# Patient Record
Sex: FEMALE | Race: WHITE | NOT HISPANIC OR LATINO | URBAN - METROPOLITAN AREA
[De-identification: names, ages, dates, MRNs, and addresses within clinical notes are randomized per-mention and may not be internally consistent; named-entity substitution may affect disease eponyms.]

---

## 2017-07-08 ENCOUNTER — COMMUNICATION - HEALTHEAST (OUTPATIENT)
Dept: ENDOCRINOLOGY | Facility: CLINIC | Age: 32
End: 2017-07-08

## 2017-07-08 DIAGNOSIS — E03.9 HYPOTHYROIDISM: ICD-10-CM

## 2017-07-20 ENCOUNTER — AMBULATORY - HEALTHEAST (OUTPATIENT)
Dept: ENDOCRINOLOGY | Facility: CLINIC | Age: 32
End: 2017-07-20

## 2017-07-20 ENCOUNTER — COMMUNICATION - HEALTHEAST (OUTPATIENT)
Dept: LAB | Facility: CLINIC | Age: 32
End: 2017-07-20

## 2017-07-20 DIAGNOSIS — E03.9 HYPOTHYROIDISM: ICD-10-CM

## 2017-07-26 ENCOUNTER — AMBULATORY - HEALTHEAST (OUTPATIENT)
Dept: LAB | Facility: CLINIC | Age: 32
End: 2017-07-26

## 2017-07-26 DIAGNOSIS — E03.9 HYPOTHYROIDISM: ICD-10-CM

## 2017-08-02 ENCOUNTER — OFFICE VISIT - HEALTHEAST (OUTPATIENT)
Dept: ENDOCRINOLOGY | Facility: CLINIC | Age: 32
End: 2017-08-02

## 2017-08-02 DIAGNOSIS — E03.9 HYPOTHYROIDISM: ICD-10-CM

## 2017-08-02 ASSESSMENT — MIFFLIN-ST. JEOR: SCORE: 1527.52

## 2018-01-19 ENCOUNTER — OFFICE VISIT - HEALTHEAST (OUTPATIENT)
Dept: FAMILY MEDICINE | Facility: CLINIC | Age: 33
End: 2018-01-19

## 2018-01-19 ENCOUNTER — RECORDS - HEALTHEAST (OUTPATIENT)
Dept: GENERAL RADIOLOGY | Facility: CLINIC | Age: 33
End: 2018-01-19

## 2018-01-19 DIAGNOSIS — L70.0 ACNE VULGARIS: ICD-10-CM

## 2018-01-19 DIAGNOSIS — Z79.899 MEDICATION MANAGEMENT: ICD-10-CM

## 2018-01-19 DIAGNOSIS — M25.511 PAIN IN RIGHT SHOULDER: ICD-10-CM

## 2018-01-19 DIAGNOSIS — M25.511 ACUTE PAIN OF RIGHT SHOULDER: ICD-10-CM

## 2018-01-19 DIAGNOSIS — Z23 NEED FOR VACCINATION: ICD-10-CM

## 2018-01-19 LAB
ANION GAP SERPL CALCULATED.3IONS-SCNC: 8 MMOL/L (ref 5–18)
BUN SERPL-MCNC: 13 MG/DL (ref 8–22)
CALCIUM SERPL-MCNC: 9.3 MG/DL (ref 8.5–10.5)
CHLORIDE BLD-SCNC: 104 MMOL/L (ref 98–107)
CO2 SERPL-SCNC: 26 MMOL/L (ref 22–31)
CREAT SERPL-MCNC: 0.65 MG/DL (ref 0.6–1.1)
GFR SERPL CREATININE-BSD FRML MDRD: >60 ML/MIN/1.73M2
GLUCOSE BLD-MCNC: 79 MG/DL (ref 70–125)
POTASSIUM BLD-SCNC: 4.5 MMOL/L (ref 3.5–5)
SODIUM SERPL-SCNC: 138 MMOL/L (ref 136–145)

## 2018-01-19 ASSESSMENT — MIFFLIN-ST. JEOR: SCORE: 1513.91

## 2018-02-02 ENCOUNTER — COMMUNICATION - HEALTHEAST (OUTPATIENT)
Dept: FAMILY MEDICINE | Facility: CLINIC | Age: 33
End: 2018-02-02

## 2018-02-12 ENCOUNTER — COMMUNICATION - HEALTHEAST (OUTPATIENT)
Dept: FAMILY MEDICINE | Facility: CLINIC | Age: 33
End: 2018-02-12

## 2018-03-09 ENCOUNTER — COMMUNICATION - HEALTHEAST (OUTPATIENT)
Dept: ADMINISTRATIVE | Facility: CLINIC | Age: 33
End: 2018-03-09

## 2018-03-09 DIAGNOSIS — E03.9 HYPOTHYROIDISM: ICD-10-CM

## 2018-12-08 ENCOUNTER — COMMUNICATION - HEALTHEAST (OUTPATIENT)
Dept: ENDOCRINOLOGY | Facility: CLINIC | Age: 33
End: 2018-12-08

## 2018-12-08 DIAGNOSIS — E03.9 HYPOTHYROIDISM: ICD-10-CM

## 2019-01-22 ENCOUNTER — COMMUNICATION - HEALTHEAST (OUTPATIENT)
Dept: ADMINISTRATIVE | Facility: CLINIC | Age: 34
End: 2019-01-22

## 2019-01-22 DIAGNOSIS — E03.9 HYPOTHYROIDISM: ICD-10-CM

## 2019-01-24 ENCOUNTER — AMBULATORY - HEALTHEAST (OUTPATIENT)
Dept: ENDOCRINOLOGY | Facility: CLINIC | Age: 34
End: 2019-01-24

## 2019-01-24 DIAGNOSIS — E03.9 HYPOTHYROIDISM: ICD-10-CM

## 2019-02-28 ENCOUNTER — COMMUNICATION - HEALTHEAST (OUTPATIENT)
Dept: FAMILY MEDICINE | Facility: CLINIC | Age: 34
End: 2019-02-28

## 2019-02-28 ENCOUNTER — AMBULATORY - HEALTHEAST (OUTPATIENT)
Dept: LAB | Facility: CLINIC | Age: 34
End: 2019-02-28

## 2019-02-28 DIAGNOSIS — E03.9 HYPOTHYROIDISM: ICD-10-CM

## 2019-02-28 LAB
T4 FREE SERPL-MCNC: 1.1 NG/DL (ref 0.7–1.8)
TSH SERPL DL<=0.005 MIU/L-ACNC: 1.42 UIU/ML (ref 0.3–5)

## 2019-03-07 ENCOUNTER — OFFICE VISIT - HEALTHEAST (OUTPATIENT)
Dept: ENDOCRINOLOGY | Facility: CLINIC | Age: 34
End: 2019-03-07

## 2019-03-07 DIAGNOSIS — E03.9 HYPOTHYROIDISM, UNSPECIFIED TYPE: ICD-10-CM

## 2019-03-07 DIAGNOSIS — E03.9 HYPOTHYROIDISM: ICD-10-CM

## 2019-03-07 ASSESSMENT — MIFFLIN-ST. JEOR: SCORE: 1539.31

## 2020-02-19 ENCOUNTER — COMMUNICATION - HEALTHEAST (OUTPATIENT)
Dept: ENDOCRINOLOGY | Facility: CLINIC | Age: 35
End: 2020-02-19

## 2020-02-19 DIAGNOSIS — E03.9 HYPOTHYROIDISM: ICD-10-CM

## 2020-02-28 ENCOUNTER — AMBULATORY - HEALTHEAST (OUTPATIENT)
Dept: LAB | Facility: CLINIC | Age: 35
End: 2020-02-28

## 2020-02-28 DIAGNOSIS — E03.9 HYPOTHYROIDISM: ICD-10-CM

## 2020-02-28 LAB
T4 FREE SERPL-MCNC: 1.2 NG/DL (ref 0.7–1.8)
TSH SERPL DL<=0.005 MIU/L-ACNC: 1.1 UIU/ML (ref 0.3–5)

## 2020-03-06 ENCOUNTER — OFFICE VISIT - HEALTHEAST (OUTPATIENT)
Dept: ENDOCRINOLOGY | Facility: CLINIC | Age: 35
End: 2020-03-06

## 2020-03-06 DIAGNOSIS — E03.9 HYPOTHYROIDISM: ICD-10-CM

## 2020-03-06 ASSESSMENT — MIFFLIN-ST. JEOR: SCORE: 1620.51

## 2020-05-18 ENCOUNTER — OFFICE VISIT - HEALTHEAST (OUTPATIENT)
Dept: FAMILY MEDICINE | Facility: CLINIC | Age: 35
End: 2020-05-18

## 2020-05-18 DIAGNOSIS — F33.0 MILD EPISODE OF RECURRENT MAJOR DEPRESSIVE DISORDER (H): ICD-10-CM

## 2020-05-18 DIAGNOSIS — E03.9 HYPOTHYROIDISM, UNSPECIFIED TYPE: ICD-10-CM

## 2020-05-18 DIAGNOSIS — F90.2 ATTENTION DEFICIT HYPERACTIVITY DISORDER (ADHD), COMBINED TYPE: ICD-10-CM

## 2020-05-18 DIAGNOSIS — F41.9 ANXIETY: ICD-10-CM

## 2020-05-18 ASSESSMENT — ANXIETY QUESTIONNAIRES
GAD7 TOTAL SCORE: 13
2. NOT BEING ABLE TO STOP OR CONTROL WORRYING: MORE THAN HALF THE DAYS
5. BEING SO RESTLESS THAT IT IS HARD TO SIT STILL: MORE THAN HALF THE DAYS
7. FEELING AFRAID AS IF SOMETHING AWFUL MIGHT HAPPEN: SEVERAL DAYS
7. FEELING AFRAID AS IF SOMETHING AWFUL MIGHT HAPPEN: SEVERAL DAYS
4. TROUBLE RELAXING: MORE THAN HALF THE DAYS
1. FEELING NERVOUS, ANXIOUS, OR ON EDGE: MORE THAN HALF THE DAYS
5. BEING SO RESTLESS THAT IT IS HARD TO SIT STILL: MORE THAN HALF THE DAYS
GAD7 TOTAL SCORE: 13
6. BECOMING EASILY ANNOYED OR IRRITABLE: NEARLY EVERY DAY
3. WORRYING TOO MUCH ABOUT DIFFERENT THINGS: SEVERAL DAYS
2. NOT BEING ABLE TO STOP OR CONTROL WORRYING: MORE THAN HALF THE DAYS
3. WORRYING TOO MUCH ABOUT DIFFERENT THINGS: SEVERAL DAYS
1. FEELING NERVOUS, ANXIOUS, OR ON EDGE: MORE THAN HALF THE DAYS
4. TROUBLE RELAXING: MORE THAN HALF THE DAYS
6. BECOMING EASILY ANNOYED OR IRRITABLE: NEARLY EVERY DAY

## 2020-05-18 ASSESSMENT — PATIENT HEALTH QUESTIONNAIRE - PHQ9
SUM OF ALL RESPONSES TO PHQ QUESTIONS 1-9: 10
SUM OF ALL RESPONSES TO PHQ QUESTIONS 1-9: 10

## 2020-05-26 ENCOUNTER — COMMUNICATION - HEALTHEAST (OUTPATIENT)
Dept: FAMILY MEDICINE | Facility: CLINIC | Age: 35
End: 2020-05-26

## 2020-05-26 ENCOUNTER — OFFICE VISIT - HEALTHEAST (OUTPATIENT)
Dept: FAMILY MEDICINE | Facility: CLINIC | Age: 35
End: 2020-05-26

## 2020-05-26 DIAGNOSIS — F33.0 MILD EPISODE OF RECURRENT MAJOR DEPRESSIVE DISORDER (H): ICD-10-CM

## 2020-05-26 DIAGNOSIS — F41.9 ANXIETY: ICD-10-CM

## 2020-05-26 DIAGNOSIS — E03.9 HYPOTHYROIDISM, UNSPECIFIED TYPE: ICD-10-CM

## 2020-05-26 DIAGNOSIS — F90.2 ATTENTION DEFICIT HYPERACTIVITY DISORDER (ADHD), COMBINED TYPE: ICD-10-CM

## 2020-06-17 ENCOUNTER — COMMUNICATION - HEALTHEAST (OUTPATIENT)
Dept: FAMILY MEDICINE | Facility: CLINIC | Age: 35
End: 2020-06-17

## 2020-06-17 DIAGNOSIS — F90.2 ATTENTION DEFICIT HYPERACTIVITY DISORDER (ADHD), COMBINED TYPE: ICD-10-CM

## 2020-07-23 ENCOUNTER — APPOINTMENT (OUTPATIENT)
Age: 35
Setting detail: DERMATOLOGY
End: 2020-07-23

## 2020-07-23 VITALS — WEIGHT: 200 LBS | HEIGHT: 66 IN

## 2020-07-23 DIAGNOSIS — L81.4 OTHER MELANIN HYPERPIGMENTATION: ICD-10-CM

## 2020-07-23 DIAGNOSIS — D22 MELANOCYTIC NEVI: ICD-10-CM

## 2020-07-23 DIAGNOSIS — D18.0 HEMANGIOMA: ICD-10-CM

## 2020-07-23 DIAGNOSIS — L70.0 ACNE VULGARIS: ICD-10-CM

## 2020-07-23 DIAGNOSIS — Z71.89 OTHER SPECIFIED COUNSELING: ICD-10-CM

## 2020-07-23 PROBLEM — D18.01 HEMANGIOMA OF SKIN AND SUBCUTANEOUS TISSUE: Status: ACTIVE | Noted: 2020-07-23

## 2020-07-23 PROBLEM — D22.5 MELANOCYTIC NEVI OF TRUNK: Status: ACTIVE | Noted: 2020-07-23

## 2020-07-23 PROCEDURE — 99203 OFFICE O/P NEW LOW 30 MIN: CPT

## 2020-07-23 PROCEDURE — OTHER ADDITIONAL NOTES: OTHER

## 2020-07-23 PROCEDURE — OTHER COUNSELING: OTHER

## 2020-07-23 PROCEDURE — OTHER PRESCRIPTION: OTHER

## 2020-07-23 RX ORDER — CLINDAMYCIN PHOSPHATE 10 MG/ML
1% LOTION TOPICAL BID
Qty: 1 | Refills: 1 | Status: ERX | COMMUNITY
Start: 2020-07-23

## 2020-07-23 RX ORDER — SPIRONOLACTONE 50 MG/1
50 MG TABLET, FILM COATED ORAL BID
Qty: 60 | Refills: 1 | Status: ERX | COMMUNITY
Start: 2020-07-23

## 2020-07-23 ASSESSMENT — LOCATION ZONE DERM: LOCATION ZONE: TRUNK

## 2020-07-23 ASSESSMENT — LOCATION DETAILED DESCRIPTION DERM: LOCATION DETAILED: INFERIOR THORACIC SPINE

## 2020-07-23 ASSESSMENT — LOCATION SIMPLE DESCRIPTION DERM: LOCATION SIMPLE: UPPER BACK

## 2020-07-23 NOTE — PROCEDURE: COUNSELING
Topical Retinoid Pregnancy And Lactation Text: This medication is Pregnancy Category C. It is unknown if this medication is excreted in breast milk.
Erythromycin Counseling:  I discussed with the patient the risks of erythromycin including but not limited to GI upset, allergic reaction, drug rash, diarrhea, increase in liver enzymes, and yeast infections.
Bactrim Pregnancy And Lactation Text: This medication is Pregnancy Category D and is known to cause fetal risk.  It is also excreted in breast milk.
Detail Level: Generalized
Tetracycline Counseling: Patient counseled regarding possible photosensitivity and increased risk for sunburn.  Patient instructed to avoid sunlight, if possible.  When exposed to sunlight, patients should wear protective clothing, sunglasses, and sunscreen.  The patient was instructed to call the office immediately if the following severe adverse effects occur:  hearing changes, easy bruising/bleeding, severe headache, or vision changes.  The patient verbalized understanding of the proper use and possible adverse effects of tetracycline.  All of the patient's questions and concerns were addressed. Patient understands to avoid pregnancy while on therapy due to potential birth defects.
Include Pregnancy/Lactation Warning?: No
Dapsone Counseling: I discussed with the patient the risks of dapsone including but not limited to hemolytic anemia, agranulocytosis, rashes, methemoglobinemia, kidney failure, peripheral neuropathy, headaches, GI upset, and liver toxicity.  Patients who start dapsone require monitoring including baseline LFTs and weekly CBCs for the first month, then every month thereafter.  The patient verbalized understanding of the proper use and possible adverse effects of dapsone.  All of the patient's questions and concerns were addressed.
Topical Retinoid counseling:  Patient advised to apply a pea-sized amount only at bedtime and wait 30 minutes after washing their face before applying.  If too drying, patient may add a non-comedogenic moisturizer. The patient verbalized understanding of the proper use and possible adverse effects of retinoids.  All of the patient's questions and concerns were addressed.
Detail Level: Detailed
Benzoyl Peroxide Pregnancy And Lactation Text: This medication is Pregnancy Category C. It is unknown if benzoyl peroxide is excreted in breast milk.
Sarecycline Pregnancy And Lactation Text: This medication is Pregnancy Category D and not consider safe during pregnancy. It is also excreted in breast milk.
Isotretinoin Counseling: Patient should get monthly blood tests, not donate blood, not drive at night if vision affected, not share medication, and not undergo elective surgery for 6 months after tx completed. Side effects reviewed, pt to contact office should one occur.
Tazorac Counseling:  Patient advised that medication is irritating and drying.  Patient may need to apply sparingly and wash off after an hour before eventually leaving it on overnight.  The patient verbalized understanding of the proper use and possible adverse effects of tazorac.  All of the patient's questions and concerns were addressed.
Azithromycin Counseling:  I discussed with the patient the risks of azithromycin including but not limited to GI upset, allergic reaction, drug rash, diarrhea, and yeast infections.
Doxycycline Pregnancy And Lactation Text: This medication is Pregnancy Category D and not consider safe during pregnancy. It is also excreted in breast milk but is considered safe for shorter treatment courses.
Dapsone Pregnancy And Lactation Text: This medication is Pregnancy Category C and is not considered safe during pregnancy or breast feeding.
Spironolactone Counseling: Patient advised regarding risks of diarrhea, abdominal pain, hyperkalemia, birth defects (for female patients), liver toxicity and renal toxicity. The patient may need blood work to monitor liver and kidney function and potassium levels while on therapy. The patient verbalized understanding of the proper use and possible adverse effects of spironolactone.  All of the patient's questions and concerns were addressed.
Topical Sulfur Applications Pregnancy And Lactation Text: This medication is Pregnancy Category C and has an unknown safety profile during pregnancy. It is unknown if this topical medication is excreted in breast milk.
High Dose Vitamin A Pregnancy And Lactation Text: High dose vitamin A therapy is contraindicated during pregnancy and breast feeding.
Topical Sulfur Applications Counseling: Topical Sulfur Counseling: Patient counseled that this medication may cause skin irritation or allergic reactions.  In the event of skin irritation, the patient was advised to reduce the amount of the drug applied or use it less frequently.   The patient verbalized understanding of the proper use and possible adverse effects of topical sulfur application.  All of the patient's questions and concerns were addressed.
Birth Control Pills Pregnancy And Lactation Text: This medication should be avoided if pregnant and for the first 30 days post-partum.
Topical Clindamycin Pregnancy And Lactation Text: This medication is Pregnancy Category B and is considered safe during pregnancy. It is unknown if it is excreted in breast milk.
Minocycline Counseling: Patient advised regarding possible photosensitivity and discoloration of the teeth, skin, lips, tongue and gums.  Patient instructed to avoid sunlight, if possible.  When exposed to sunlight, patients should wear protective clothing, sunglasses, and sunscreen.  The patient was instructed to call the office immediately if the following severe adverse effects occur:  hearing changes, easy bruising/bleeding, severe headache, or vision changes.  The patient verbalized understanding of the proper use and possible adverse effects of minocycline.  All of the patient's questions and concerns were addressed.
High Dose Vitamin A Counseling: Side effects reviewed, pt to contact office should one occur.
Benzoyl Peroxide Counseling: Patient counseled that medicine may cause skin irritation and bleach clothing.  In the event of skin irritation, the patient was advised to reduce the amount of the drug applied or use it less frequently.   The patient verbalized understanding of the proper use and possible adverse effects of benzoyl peroxide.  All of the patient's questions and concerns were addressed.
Tazorac Pregnancy And Lactation Text: This medication is not safe during pregnancy. It is unknown if this medication is excreted in breast milk.
Birth Control Pills Counseling: Birth Control Pill Counseling: I discussed with the patient the potential side effects of OCPs including but not limited to increased risk of stroke, heart attack, thrombophlebitis, deep venous thrombosis, hepatic adenomas, breast changes, GI upset, headaches, and depression.  The patient verbalized understanding of the proper use and possible adverse effects of OCPs. All of the patient's questions and concerns were addressed.
Sarecycline Counseling: Patient advised regarding possible photosensitivity and discoloration of the teeth, skin, lips, tongue and gums.  Patient instructed to avoid sunlight, if possible.  When exposed to sunlight, patients should wear protective clothing, sunglasses, and sunscreen.  The patient was instructed to call the office immediately if the following severe adverse effects occur:  hearing changes, easy bruising/bleeding, severe headache, or vision changes.  The patient verbalized understanding of the proper use and possible adverse effects of sarecycline.  All of the patient's questions and concerns were addressed.
Bactrim Counseling:  I discussed with the patient the risks of sulfa antibiotics including but not limited to GI upset, allergic reaction, drug rash, diarrhea, dizziness, photosensitivity, and yeast infections.  Rarely, more serious reactions can occur including but not limited to aplastic anemia, agranulocytosis, methemoglobinemia, blood dyscrasias, liver or kidney failure, lung infiltrates or desquamative/blistering drug rashes.
Doxycycline Counseling:  Patient counseled regarding possible photosensitivity and increased risk for sunburn.  Patient instructed to avoid sunlight, if possible.  When exposed to sunlight, patients should wear protective clothing, sunglasses, and sunscreen.  The patient was instructed to call the office immediately if the following severe adverse effects occur:  hearing changes, easy bruising/bleeding, severe headache, or vision changes.  The patient verbalized understanding of the proper use and possible adverse effects of doxycycline.  All of the patient's questions and concerns were addressed.
Spironolactone Pregnancy And Lactation Text: This medication can cause feminization of the male fetus and should be avoided during pregnancy. The active metabolite is also found in breast milk.
Erythromycin Pregnancy And Lactation Text: This medication is Pregnancy Category B and is considered safe during pregnancy. It is also excreted in breast milk.
Azithromycin Pregnancy And Lactation Text: This medication is considered safe during pregnancy and is also secreted in breast milk.
Isotretinoin Pregnancy And Lactation Text: This medication is Pregnancy Category X and is considered extremely dangerous during pregnancy. It is unknown if it is excreted in breast milk.
Topical Clindamycin Counseling: Patient counseled that this medication may cause skin irritation or allergic reactions.  In the event of skin irritation, the patient was advised to reduce the amount of the drug applied or use it less frequently.   The patient verbalized understanding of the proper use and possible adverse effects of clindamycin.  All of the patient's questions and concerns were addressed.

## 2020-07-23 NOTE — PROCEDURE: ADDITIONAL NOTES
Additional Notes: Mole on L posterior thigh was previously biopsied as compound nevus with inflammation.
Detail Level: Simple

## 2020-08-01 ENCOUNTER — VIRTUAL VISIT (OUTPATIENT)
Dept: FAMILY MEDICINE | Facility: OTHER | Age: 35
End: 2020-08-01
Payer: COMMERCIAL

## 2020-08-01 ENCOUNTER — COMMUNICATION - HEALTHEAST (OUTPATIENT)
Dept: SCHEDULING | Facility: CLINIC | Age: 35
End: 2020-08-01

## 2020-08-02 ENCOUNTER — AMBULATORY - HEALTHEAST (OUTPATIENT)
Dept: FAMILY MEDICINE | Facility: CLINIC | Age: 35
End: 2020-08-02

## 2020-08-02 DIAGNOSIS — Z20.822 SUSPECTED COVID-19 VIRUS INFECTION: ICD-10-CM

## 2020-08-02 NOTE — PROGRESS NOTES
"Date: 2020 15:23:51  Clinician: Cuca Burgess  Clinician NPI: [id number]  Patient: Eli Cassidy  Patient : 1985  Patient Address[facility] [place] [address]  Patient Phone: [phone]  Visit Protocol: URI  Patient Summary:  Eli is a 35 year old ( : 1985 ) female who initiated a Visit for COVID-19 (Coronavirus) evaluation and screening. When asked the question \"Please sign me up to receive news, health information and promotions from [employer].\", Eli responded \"No\".    Eli states her symptoms started 1-2 days ago.   Her symptoms consist of nausea, a sore throat, rhinitis, malaise, and a headache. She is experiencing mild difficulty breathing with activities but can speak normally in full sentences.   Symptom details     Nasal secretions: The color of her mucus is white, green, and yellow.    Sore throat: Eli reports having moderate throat pain (4-6 on a 10 point pain scale), does not have exudate on her tonsils, and can swallow liquids. She is not sure if the lymph nodes in her neck are enlarged. A rash has not appeared on the skin since the sore throat started.     Headache: She states the headache is mild (1-3 on a 10 point pain scale).      Eli denies having wheezing, teeth pain, ageusia, diarrhea, myalgias, anosmia, facial pain or pressure, fever, cough, nasal congestion, vomiting, ear pain, and chills. She also denies having recent facial or sinus surgery in the past 60 days and taking antibiotic medication in the past month.   Precipitating events  Within the past week, Eli has not been exposed to someone with strep throat.   Pertinent COVID-19 (Coronavirus) information  In the past 14 days, Eli has not worked in a congregate living setting.   She does not work or volunteer as healthcare worker or a  and does not work or volunteer in a healthcare facility.   Eli also has not lived in a congregate living setting in the past 14 " days. She does not live with a healthcare worker.   Eli has not had a close contact with a laboratory-confirmed COVID-19 patient within 14 days of symptom onset.   Pertinent medical history  Eli had 1 sinus infection within the past year.   Eli does not get yeast infections when she takes antibiotics.   Eli does not need a return to work/school note.   Weight: 203 lbs   Eli does not smoke or use smokeless tobacco.   She denies pregnancy and denies breastfeeding. She has menstruated in the past month.   Weight: 203 lbs    MEDICATIONS: Mirena intrauterine, spironolactone oral, levothyroxine oral, ALLERGIES: NKDA  Clinician Response:  Dear Eli,   Your symptoms show that you may have coronavirus (COVID-19). This illness can cause fever, cough and trouble breathing. Many people get a mild case and get better on their own. Some people can get very sick.  What should I do?  We would like to test you for this virus.   1. Please call [phone] to schedule your visit. Explain that you were referred by [facility] to have a COVID-19 test. Be ready to share your [facility] visit ID number.  The following will serve as your written order for this COVID Test, ordered by me, for the indication of suspected COVID [Z20.828]: The test will be ordered in [employer], our electronic health record, after you are scheduled. It will show as ordered and authorized by Main Gifford MD.  Order: COVID-19 (Coronavirus) PCR for SYMPTOMATIC testing from [facility].      2. When it's time for your COVID test:  Stay at least 6 feet away from others. (If someone will drive you to your test, stay in the backseat, as far away from the  as you can.)   Cover your mouth and nose with a mask, tissue or washcloth.  Go straight to the testing site. Don't make any stops on the way there or back.      3.Starting now: Stay home and away from others (self-isolate) until:   You've had no fever---and no medicine that reduces fever---for 3 full days  "(72 hours). And...   Your other symptoms have gotten better. For example, your cough or breathing has improved. And...   At least 10 days have passed since your symptoms started.       During this time, don't leave the house except for testing or medical care.   Stay in your own room, even for meals. Use your own bathroom if you can.   Stay away from others in your home. No hugging, kissing or shaking hands. No visitors.  Don't go to work, school or anywhere else.    Clean \"high touch\" surfaces often (doorknobs, counters, handles, etc.). Use a household cleaning spray or wipes. You'll find a full list of  on the EPA website: www.epa.gov/pesticide-registration/list-n-disinfectants-use-against-sars-cov-2.   Cover your mouth and nose with a mask, tissue or washcloth to avoid spreading germs.  Wash your hands and face often. Use soap and water.  Caregivers in these groups are at risk for severe illness due to COVID-19:  o People 65 years and older  o People who live in a nursing home or long-term care facility  o People with chronic disease (lung, heart, cancer, diabetes, kidney, liver, immunologic)  o People who have a weakened immune system, including those who:   Are in cancer treatment  Take medicine that weakens the immune system, such as corticosteroids  Had a bone marrow or organ transplant  Have an immune deficiency  Have poorly controlled HIV or AIDS  Are obese (body mass index of 40 or higher)  Smoke regularly   o Caregivers should wear gloves while washing dishes, handling laundry and cleaning bedrooms and bathrooms.  o Use caution when washing and drying laundry: Don't shake dirty laundry, and use the warmest water setting that you can.  o For more tips, go to www.cdc.gov/coronavirus/2019-ncov/downloads/10Things.pdf.    4.Sign up for [employer]. We know it's scary to hear that you might have COVID-19. We want to track your symptoms to make sure you're okay over the next 2 weeks. Please look for an " email from [employer]i [employer]---this is a free, online program that we'll use to keep in touch. To sign up, follow the link in the email. Learn more at [email or web address]  How can I take care of myself?   Get lots of rest. Drink extra fluids (unless a doctor has told you not to).   Take Tylenol (acetaminophen) for fever or pain. If you have liver or kidney problems, ask your family doctor if it's okay to take Tylenol.   Adults can take either:    650 mg (two 325 mg pills) every 4 to 6 hours, or...   1,000 mg (two 500 mg pills) every 8 hours as needed.    Note: Don't take more than 3,000 mg in one day. Acetaminophen is found in many medicines (both prescribed and over-the-counter medicines). Read all labels to be sure you don't take too much.   For children, check the Tylenol bottle for the right dose. The dose is based on the child's age or weight.    If you have other health problems (like cancer, heart failure, an organ transplant or severe kidney disease): Call your specialty clinic if you don't feel better in the next 2 days.       Know when to call 911. Emergency warning signs include:    Trouble breathing or shortness of breath Pain or pressure in the chest that doesn't go away Feeling confused like you haven't felt before, or not being able to wake up Bluish-colored lips or face.  Where can I get more information?   [facility] -- About COVID-19: [email or web address]   CDC -- What to Do If You're Sick: www.cdc.gov/coronavirus/2019-ncov/about/steps-when-sick.html   CDC -- Ending Home Isolation: www.cdc.gov/coronavirus/2019-ncov/hcp/disposition-in-home-patients.html   CDC -- Caring for Someone: www.cdc.gov/coronavirus/2019-ncov/if-you-are-sick/care-for-someone.html   [facility] -- Interim Guidance for Hospital Discharge to Home: www.health.[facility].[place]/diseases/coronavirus/hcp/hospdischarge.pdf   [facility] clinical trials (COVID-19 research studies):  clinicalaffairs.[facility]-clinical-trials    Below are the COVID-19 hotlines at the [place] Department of Health ([facility]). Interpreters are available.    For health questions: Call 736-732-2420 or 1-800.335.7898 (7 a.m. to 7 p.m.) For questions about schools and childcare: Call 571-505-9277 or 1-577.984.1009 (7 a.m. to 7 p.m.)    Diagnosis: Cough  Diagnosis ICD: R05

## 2020-08-04 ENCOUNTER — COMMUNICATION - HEALTHEAST (OUTPATIENT)
Dept: SCHEDULING | Facility: CLINIC | Age: 35
End: 2020-08-04

## 2021-01-29 ENCOUNTER — OFFICE VISIT - HEALTHEAST (OUTPATIENT)
Dept: FAMILY MEDICINE | Facility: CLINIC | Age: 36
End: 2021-01-29

## 2021-01-29 ENCOUNTER — COMMUNICATION - HEALTHEAST (OUTPATIENT)
Dept: FAMILY MEDICINE | Facility: CLINIC | Age: 36
End: 2021-01-29

## 2021-01-29 DIAGNOSIS — L03.112 CELLULITIS OF LEFT AXILLA: ICD-10-CM

## 2021-01-29 ASSESSMENT — MIFFLIN-ST. JEOR: SCORE: 1687.19

## 2021-02-09 ENCOUNTER — COMMUNICATION - HEALTHEAST (OUTPATIENT)
Dept: ADMINISTRATIVE | Facility: CLINIC | Age: 36
End: 2021-02-09

## 2021-02-18 ENCOUNTER — COMMUNICATION - HEALTHEAST (OUTPATIENT)
Dept: LAB | Facility: CLINIC | Age: 36
End: 2021-02-18

## 2021-02-18 DIAGNOSIS — E03.9 HYPOTHYROIDISM: ICD-10-CM

## 2021-03-01 ENCOUNTER — COMMUNICATION - HEALTHEAST (OUTPATIENT)
Dept: ADMINISTRATIVE | Facility: CLINIC | Age: 36
End: 2021-03-01

## 2021-03-01 DIAGNOSIS — E03.9 HYPOTHYROIDISM: ICD-10-CM

## 2021-03-04 ENCOUNTER — AMBULATORY - HEALTHEAST (OUTPATIENT)
Dept: LAB | Facility: CLINIC | Age: 36
End: 2021-03-04

## 2021-03-04 DIAGNOSIS — E03.9 HYPOTHYROIDISM: ICD-10-CM

## 2021-03-04 LAB
ANION GAP SERPL CALCULATED.3IONS-SCNC: 8 MMOL/L (ref 5–18)
BUN SERPL-MCNC: 12 MG/DL (ref 8–22)
CALCIUM SERPL-MCNC: 9.3 MG/DL (ref 8.5–10.5)
CHLORIDE BLD-SCNC: 107 MMOL/L (ref 98–107)
CO2 SERPL-SCNC: 26 MMOL/L (ref 22–31)
CREAT SERPL-MCNC: 0.74 MG/DL (ref 0.6–1.1)
GFR SERPL CREATININE-BSD FRML MDRD: >60 ML/MIN/1.73M2
GLUCOSE BLD-MCNC: 84 MG/DL (ref 70–125)
POTASSIUM BLD-SCNC: 4.6 MMOL/L (ref 3.5–5)
SODIUM SERPL-SCNC: 141 MMOL/L (ref 136–145)
T4 FREE SERPL-MCNC: 0.8 NG/DL (ref 0.7–1.8)
TSH SERPL DL<=0.005 MIU/L-ACNC: 4.3 UIU/ML (ref 0.3–5)

## 2021-03-05 ENCOUNTER — COMMUNICATION - HEALTHEAST (OUTPATIENT)
Dept: FAMILY MEDICINE | Facility: CLINIC | Age: 36
End: 2021-03-05

## 2021-03-05 ENCOUNTER — COMMUNICATION - HEALTHEAST (OUTPATIENT)
Dept: ENDOCRINOLOGY | Facility: CLINIC | Age: 36
End: 2021-03-05

## 2021-03-05 DIAGNOSIS — E03.9 HYPOTHYROIDISM: ICD-10-CM

## 2021-03-18 ENCOUNTER — TRANSFERRED RECORDS (OUTPATIENT)
Dept: MULTI SPECIALTY CLINIC | Facility: CLINIC | Age: 36
End: 2021-03-18

## 2021-03-18 LAB — PAP SMEAR - HIM PATIENT REPORTED: NEGATIVE

## 2021-03-28 ENCOUNTER — COMMUNICATION - HEALTHEAST (OUTPATIENT)
Dept: ENDOCRINOLOGY | Facility: CLINIC | Age: 36
End: 2021-03-28

## 2021-03-28 DIAGNOSIS — E03.9 HYPOTHYROIDISM: ICD-10-CM

## 2021-03-30 ENCOUNTER — OFFICE VISIT - HEALTHEAST (OUTPATIENT)
Dept: ENDOCRINOLOGY | Facility: CLINIC | Age: 36
End: 2021-03-30

## 2021-03-30 DIAGNOSIS — E03.9 HYPOTHYROIDISM, UNSPECIFIED TYPE: ICD-10-CM

## 2021-03-30 DIAGNOSIS — E03.9 HYPOTHYROIDISM: ICD-10-CM

## 2021-03-30 RX ORDER — LEVOTHYROXINE SODIUM 125 UG/1
TABLET ORAL
Qty: 90 TABLET | Refills: 3 | Status: SHIPPED | OUTPATIENT
Start: 2021-03-30 | End: 2022-03-29

## 2021-05-27 ASSESSMENT — PATIENT HEALTH QUESTIONNAIRE - PHQ9
SUM OF ALL RESPONSES TO PHQ QUESTIONS 1-9: 10
SUM OF ALL RESPONSES TO PHQ QUESTIONS 1-9: 10

## 2021-05-28 ASSESSMENT — ANXIETY QUESTIONNAIRES
GAD7 TOTAL SCORE: 13
GAD7 TOTAL SCORE: 13

## 2021-05-29 ENCOUNTER — RECORDS - HEALTHEAST (OUTPATIENT)
Dept: ADMINISTRATIVE | Facility: CLINIC | Age: 36
End: 2021-05-29

## 2021-05-31 VITALS — BODY MASS INDEX: 29.06 KG/M2 | WEIGHT: 180.8 LBS | HEIGHT: 66 IN

## 2021-05-31 VITALS — HEIGHT: 66 IN | BODY MASS INDEX: 29.54 KG/M2 | WEIGHT: 183.8 LBS

## 2021-06-02 VITALS — HEIGHT: 66 IN | WEIGHT: 186.4 LBS | BODY MASS INDEX: 29.96 KG/M2

## 2021-06-04 VITALS
DIASTOLIC BLOOD PRESSURE: 67 MMHG | HEIGHT: 66 IN | BODY MASS INDEX: 32.83 KG/M2 | WEIGHT: 204.3 LBS | SYSTOLIC BLOOD PRESSURE: 108 MMHG

## 2021-06-05 VITALS
SYSTOLIC BLOOD PRESSURE: 118 MMHG | WEIGHT: 217.25 LBS | TEMPERATURE: 99 F | OXYGEN SATURATION: 98 % | BODY MASS INDEX: 34.91 KG/M2 | HEART RATE: 88 BPM | HEIGHT: 66 IN | DIASTOLIC BLOOD PRESSURE: 74 MMHG

## 2021-06-06 NOTE — PROGRESS NOTES
"[facility]  ENDOCRINOLOGY    Thyroid Note  3/10/2020    Eli Pineda, 1985, [id number]          Reason for visit      1. Hypothyroidism        HPI     Eli Pineda is a very pleasant 35 y.o. old female who presents for follow up.  SUMMARY:  She was diagnosed on a routine screening test for ovum donation at a fertility clinic. She had symptoms but assumed they were due to postpartum.. She has never had thyroid problems in the past.Her mother is hypothyroid. She never had any preceeding hyperthyroid symptoms.    TODAY:  Eli returns today in f/u for Hypothyroidism. She reports that she is feeling well at present. She is having no problems referable to her neck. She is taking Levothyroxine 125 mcg daily. Her current TSH is 1.10 and fT4 is 1.2.       Past Medical History     Patient Active Problem List   Diagnosis     Colitis     Elevated C-reactive Protein     Nausea     Headache     Chest Tightness Or Heavy Pressure     Hypothyroidism       Family History       family history is not on file.    Social History      reports that she has never smoked. She has never used smokeless tobacco.      Review of Systems     Patient denies fatigue, weight changes, heat/cold intolerance, bowel/skin changes or CVS symptoms.   Remainder per HPI and per attached intake form.      Vital Signs     /67 (Patient Site: Right Arm, Patient Position: Sitting, Cuff Size: Adult Large)   Ht 5' 5.5\" (1.664 m)   Wt 204 lb 4.8 oz (92.7 kg)   BMI 33.48 kg/m    Wt Readings from Last 3 Encounters:   03/06/20 204 lb 4.8 oz (92.7 kg)   03/07/19 186 lb 6.4 oz (84.6 kg)   01/19/18 180 lb 12.8 oz (82 kg)       Physical Exam     General:  Normal, NIRD,appears euthyroid  Eyes:  Pupils equal, round and reactive to light; no proptosis, lid lag or  periorbital edema.  Thyroid:  Thyroid is normal.  No tenderness or bruit  Neck: No lymph nodes  Musculoskeletal:  Muscle strength grossly normal without evidence of wasting.  Heart:  " Regular rate and rhythm without murmur.  Lungs:  Clear to auscultation.  Abdomen: Soft, non-tender, no masses or organomegaly  Neuro: Patella Reflexes were normal.No tremors  Skin:  No acanthosis nigricans or vitiligo          Assessment     1. Hypothyroidism            Plan       Eli is currently bio-chemically and physically euthyroid. She will continue on the Levothyroxine 125 mcg daily. She will f/u with me in 1 year, sooner with problems or concerns. Time spent with pt today: 25 min with >50% spent in counseling and coordination of care.        Cuca Duff   Endocrinology  3/10/2020  7:24 AM      Lab Results     TSH   Date Value Ref Range Status   02/28/2020 1.10 0.30 - 5.00 uIU/mL Final     T3, Total   Date Value Ref Range Status   12/04/2014 113 45 - 175 ng/dL Final     Thyroid Peroxidase Ab   Date Value Ref Range Status   12/04/2014 1,649.9 (H) 0.0 - 5.6 IU/mL Final     Thyroid Peroxidase Ab   Date Value Ref Range Status   12/04/2014 1,649.9 (H) 0.0 - 5.6 IU/mL Final       No results found for: [id number]    Imaging Results   Last thyroid ultrasound:  No results found for this or any previous visit.    Last thyroid nuclear scan:  No results found for this or any previous visit.    Current Medications     Outpatient Medications Prior to Visit   Medication Sig Dispense Refill     LEVONORGESTREL (MIRENA UTRN) by Intrauterine route.       levothyroxine (SYNTHROID, LEVOTHROID) 125 MCG tablet TAKE 1 TABLET BY MOUTH EVERY DAY 30 tablet 0     No facility-administered medications prior to visit.

## 2021-06-08 NOTE — PROGRESS NOTES
"Eli Pineda is a 35 y.o. female who is being evaluated via a billable video visit.      The patient has been notified of following:     \"This video visit will be conducted via a call between you and your physician/provider. We have found that certain health care needs can be provided without the need for an in-person physical exam.  This service lets us provide the care you need with a video conversation.  If a prescription is necessary we can send it directly to your pharmacy.  If lab work is needed we can place an order for that and you can then stop by our lab to have the test done at a later time.    Video visits are billed at different rates depending on your insurance coverage. Please reach out to your insurance provider with any questions.    If during the course of the call the physician/provider feels a video visit is not appropriate, you will not be charged for this service.\"    Patient has given verbal consent to a Video visit? Yes    Patient would like to receive their AVS by AVS Preference: Анна.    Patient would like the video invitation sent by: Text to cell phone: [phone]    Will anyone else be joining your video visit? No        Video Start Time: 2:08 PM     Additional provider notes:  Assessment and Plan:     1. Attention deficit hyperactivity disorder (ADHD), combined type  buPROPion (WELLBUTRIN XL) 150 MG 24 hr tablet   2. Anxiety     3. Mild episode of recurrent major depressive disorder (H)     4. Hypothyroidism, unspecified type       Obtained TIA score of 13 and PHQ 9 score of 10.  Discussed treatment options for ADHD, anxiety, depression.  Patient would like to treat the ADHD to see if this improves her symptoms.  Patient would like to start bupropion  mg daily.  Educated on its indications and side effects.  Discussed that this may be improve her depression, but potentially worsen her anxiety.  Patient understands this.  Hypothyroidism is stable with levothyroxine.  I " encouraged follow-up in 1 month for medication management or sooner with any further concerns.  She is content with the plan.    Subjective:     Eli is a 35 y.o. female presenting for a video visit.  Patient has history of hypothyroidism, anxiety, ADHD.  Patient states in 2015, she was diagnosed by [facility] with ADHD.  She was initially treated with Strattera and did find some relief with this medication.  Patient has been without medication for multiple years.  She felt as though she was able to function without medication until recent.  Patient has had worsening anxiety.  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  Her ex- is not seeing the kids when he is supposed to.  She is trying to homeschool them and work at the same time.  She works in IT at [employer].  Patient has been feeling overwhelmed.  She has had intermittent bouts of panic where she feels tingling within her hands and shortness of breath.  She is tried to deep breathe.  She has discontinued the caffeine.  She denies thoughts of suicide.    Review of Systems: A complete 14 point review of systems was obtained and is negative or as stated in the history of present illness.    Social History     Socioeconomic History     Marital status:      Spouse name: Not on file     Number of children: Not on file     Years of education: Not on file     Highest education level: Not on file   Occupational History     Not on file   Social Needs     Financial resource strain: Not on file     Food insecurity     Worry: Not on file     Inability: Not on file     Transportation needs     Medical: Not on file     Non-medical: Not on file   Tobacco Use     Smoking status: Never Smoker     Smokeless tobacco: Never Used   Substance and Sexual Activity     Alcohol use: Not on file     Drug use: Not on file     Sexual activity: Not on file   Lifestyle     Physical activity     Days per week: Not on file     Minutes  per session: Not on file     Stress: Not on file   Relationships     Social connections     Talks on phone: Not on file     Gets together: Not on file     Attends Baptism service: Not on file     Active member of club or organization: Not on file     Attends meetings of clubs or organizations: Not on file     Relationship status: Not on file     Intimate partner violence     Fear of current or ex partner: Not on file     Emotionally abused: Not on file     Physically abused: Not on file     Forced sexual activity: Not on file   Other Topics Concern     Not on file   Social History Narrative     Not on file       Active Ambulatory Problems     Diagnosis Date Noted     Colitis      Elevated C-reactive Protein      Nausea      Headache      Chest Tightness Or Heavy Pressure      Hypothyroidism 12/17/2014     Resolved Ambulatory Problems     Diagnosis Date Noted     No Resolved Ambulatory Problems     No Additional Past Medical History       No family history on file.    Objective:     LMP 05/04/2020 (Approximate)        GENERAL: Healthy, alert and no distress  EYES: Eyes grossly normal to inspection. No discharge or erythema, or obvious scleral/conjunctival abnormalities.  HENT: Normal cephalic/atraumatic.  External ears, nose and mouth without ulcers or lesions. No nasal drainage visible.  NECK: No asymmetry, masses or scars  RESP: No audible wheeze, cough, or visible cyanosis.  No visible retractions or increased work of breathing.    MS: No gross musculoskeletal defects noted.  Normal range of motion. No visible edema.  NEURO: Cranial nerves grossly intact. Mentation and speech appropriate for age.  PSYCH: Mentation appears normal, affect normal/bright, judgement and insight intact, normal speech and appearance well-groomed      Video-Visit Details    Type of service:  Video Visit    Video End Time (time video stopped): 2:21 PM   Originating Location (pt. Location): Home    Distant Location (provider location):   [facility]/OB     Platform used for Video Visit: Matt Gates, CNP

## 2021-06-08 NOTE — TELEPHONE ENCOUNTER
RN cannot approve Refill Request    RN can NOT refill this medication med is not covered by policy/route to provider     . Last office visit: 1/19/2018 Angela Gates CNP Last Physical: Visit date not found Last MTM visit: Visit date not found Last visit same specialty: 1/19/2018 Angela Gates CNP.  Next visit within 3 mo: Visit date not found  Next physical within 3 mo: Visit date not found      Barbara Morales, [facility] Triage/Med Refill 6/18/2020    Requested Prescriptions   Pending Prescriptions Disp Refills     atomoxetine (STRATTERA) 40 MG capsule [Pharmacy Med Name: ATOMOXETINE HCL 40 MG CAPSULE] 30 capsule 0     Sig: TAKE 1 CAPSULE BY MOUTH EVERY DAY       There is no refill protocol information for this order

## 2021-06-08 NOTE — PROGRESS NOTES
"Eli Pineda is a 35 y.o. female who is being evaluated via a billable video visit.      The patient has been notified of following:     \"This video visit will be conducted via a call between you and your physician/provider. We have found that certain health care needs can be provided without the need for an in-person physical exam.  This service lets us provide the care you need with a video conversation.  If a prescription is necessary we can send it directly to your pharmacy.  If lab work is needed we can place an order for that and you can then stop by our lab to have the test done at a later time.    Video visits are billed at different rates depending on your insurance coverage. Please reach out to your insurance provider with any questions.    If during the course of the call the physician/provider feels a video visit is not appropriate, you will not be charged for this service.\"    Patient has given verbal consent to a Video visit? Yes    Patient would like to receive their AVS by AVS Preference: Анна.    Patient would like the video invitation sent by: Text to cell phone: [phone]    Will anyone else be joining your video visit? No        Video Start Time: 10:14 AM     Additional provider notes:  Assessment and Plan:     1. Attention deficit hyperactivity disorder (ADHD), combined type  atomoxetine (STRATTERA) 40 MG capsule   2. Anxiety  hydrOXYzine HCL (ATARAX) 10 MG tablet   3. Hypothyroidism, unspecified type     4. Mild episode of recurrent major depressive disorder (H)       Discussed treatment options.  For ADHD, will start Strattera 40 mg daily.  Educated on its indications and side effects.  For anxiety and depression, she is not interested in taking an antidepressant.  I prescribed hydroxyzine to take as needed for anxiety.  She is to avoid taking this with other sedatives.  I encouraged follow-up in 1 month for medication management or sooner with any further concerns.  She is content " with the plan.    Subjective:     Eli is a 35 y.o. female presenting for a video visit.  She has multiple comorbidities including depression, anxiety, hypothyroidism, ADHD.  She was seen virtually on 5/18/2020 where we started bupropion 100 mg daily to see if this will treat the ADHD.  Patient states that she has been experiencing side effects from the medication.  She cried for most of the day yesterday.  The medication works well until approximately 3:30 PM where she developed cloudiness and feels groggy.  She has a distant feeling in her head.  Patient has had joint pain within her feet and has had difficulty ambulating due to this.  She has felt some itching of her skin in her lower extremities.  She would like to discontinue the bupropion.  She continues to have difficulty concentrating.  She has had worsening anxiety.  She works in IT at a bank.  She has been feeling overwhelmed.  She is caring for her two children ages 6 and 7.  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  She denies thoughts of suicide.    Review of Systems: A complete 14 point review of systems was obtained and is negative or as stated in the history of present illness.    Social History     Socioeconomic History     Marital status:      Spouse name: Not on file     Number of children: Not on file     Years of education: Not on file     Highest education level: Not on file   Occupational History     Not on file   Social Needs     Financial resource strain: Not on file     Food insecurity     Worry: Not on file     Inability: Not on file     Transportation needs     Medical: Not on file     Non-medical: Not on file   Tobacco Use     Smoking status: Never Smoker     Smokeless tobacco: Never Used   Substance and Sexual Activity     Alcohol use: Not on file     Drug use: Not on file     Sexual activity: Not on file   Lifestyle     Physical activity     Days per week: Not on file     Minutes per session: Not on file     Stress: Not on file    Relationships     Social connections     Talks on phone: Not on file     Gets together: Not on file     Attends Scientologist service: Not on file     Active member of club or organization: Not on file     Attends meetings of clubs or organizations: Not on file     Relationship status: Not on file     Intimate partner violence     Fear of current or ex partner: Not on file     Emotionally abused: Not on file     Physically abused: Not on file     Forced sexual activity: Not on file   Other Topics Concern     Not on file   Social History Narrative     Not on file       Active Ambulatory Problems     Diagnosis Date Noted     Colitis      Elevated C-reactive Protein      Nausea      Headache      Chest Tightness Or Heavy Pressure      Hypothyroidism 12/17/2014     Anxiety 05/18/2020     Attention deficit hyperactivity disorder (ADHD), combined type 05/18/2020     Mild episode of recurrent major depressive disorder (H) 05/18/2020     Resolved Ambulatory Problems     Diagnosis Date Noted     No Resolved Ambulatory Problems     No Additional Past Medical History       No family history on file.    Objective:     LMP 05/04/2020 (Approximate)      GENERAL: Healthy, alert and no distress  EYES: Eyes grossly normal to inspection. No discharge or erythema, or obvious scleral/conjunctival abnormalities.  HENT: Normal cephalic/atraumatic.  External ears, nose and mouth without ulcers or lesions. No nasal drainage visible.  NECK: No asymmetry, masses or scars  RESP: No audible wheeze, cough, or visible cyanosis.  No visible retractions or increased work of breathing.    MS: No gross musculoskeletal defects noted.  Normal range of motion. No visible edema.  SKIN: Visible skin clear. No significant rash, abnormal pigmentation or lesions.  NEURO: Cranial nerves grossly intact. Mentation and speech appropriate for age.  PSYCH: Mentation appears normal, affect normal/bright, judgement and insight intact, normal speech and appearance  well-groomed      Video-Visit Details    Type of service:  Video Visit    Video End Time (time video stopped): 10:25 AM  Originating Location (pt. Location): Home    Distant Location (provider location):  [facility] MEDICINE/OB     Platform used for Video Visit: Matt Gates CNP

## 2021-06-08 NOTE — PATIENT INSTRUCTIONS - HE
Patient Education     If you have further questions regarding your plan of care, please call your provider at Phone: [phone]    If you were prescribed medication, be sure to fill your prescription and follow medication directions    If you experience any medication side effects or minor reactions, please contact us at Phone: [phone]    If you or your family member have suicidal thoughts, contact 911 or go to your nearest Emergency Room    [facility]  Adult [phone]  Child: [phone] [facility]  Adult: [phone]  Child: [phone] [facility]  [facility]   Adult/Child: [phone]   [facility]  Adult:  [phone]  Child:  [phone]     National Suicide Prevention Line:  1-246.584.1365    [facility]    [address] [place] [address]   [id number]  [phone]      Mobile Crisis Team  [facility]    Adults, 18 and older  COPE- [phone]    Children, ages 17 and younger:  Child Crisis- [phone] Mobile Crisis Team  [facility]    24/7 Mobile Team and Crisis Line  [phone]     Text [place] to [phone] and you will be connected with a counselor who will help defuse the crisis and connect you to local resources.  Crisis Text Line is available 24 hours a day, 7 days a week.                         Based on the information provided, I would recommend you come in for an appointment to discuss these symptoms. Please schedule an appointment.    You will not be charged for this eVisit.

## 2021-06-08 NOTE — PROGRESS NOTES
Provider E-Visit time total (minutes): 3      Assessment:   The encounter diagnosis was Anxiety.     Plan:   No medications were ordered this encounter    Patient Instructions   Jose Benitez,     I would like to speak to you about all of the treatment options in person.  I will have a staff member contact you to schedule a virtual visit.  You will not be billed for this e-visit.      Patient Education     If you have further questions regarding your plan of care, please call your provider at Phone: [phone]    If you were prescribed medication, be sure to fill your prescription and follow medication directions    If you experience any medication side effects or minor reactions, please contact us at Phone: [phone]    If you or your family member have suicidal thoughts, contact 911 or go to your nearest Emergency Room    [facility]  Adult [phone]  Child: [phone] [facility]  Adult: [phone]  Child: [phone] [facility]  [facility]   Adult/Child: [phone]   [facility]  Adult:  [phone]  Child:  [phone]     National Suicide Prevention Line:  1-873.591.2102    [facility]gent [facility] for Adult [facility] Health    [address] [place] [address]   [id number]  [phone]      Mobile Crisis Team  [facility]    Adults, 18 and older  COPE- [phone]    Children, ages 17 and younger:  Child Crisis- [phone] Mobile Crisis Team  [facility]    24/7 Mobile Team and Crisis Line  [phone]     Text [place] to [phone] and you will be connected with a counselor who will help defuse the crisis and connect you to local resources.  Crisis Text Line is available 24 hours a day, 7 days a week.                         Based on the information provided, I would recommend you come in for an appointment to discuss these symptoms. Please schedule an appointment.    You will not be charged for this eVisit.    Return for further follow up if  needed. Call [phone]) or schedule an appointment via [facility]..    Subjective:   Eli Pineda is a 35 y.o. female who submitted an eVisit request for evaluation of her No chief complaint on file..  See the questionnaire and message section of encounter report for information related to history of present illness and review of systems.    The following portions of the patient's history were reviewed and updated as appropriate:  She does not have any pertinent problems on file.  She is allergic to magnevist [gadopentetate dimeglumine]..     Objective:   No exam performed today, patient submitted as eVisit.

## 2021-06-10 NOTE — TELEPHONE ENCOUNTER
Pt wanted to know if she should get tested.  Pt states no fever, fatigued, dry cough.  Pt will go to [email or web address].  Betina Loredo RN, MA  [facility]    Triage Nurse Advisor    Reason for Disposition    COVID-19 Testing, questions about    [facility] Specific Disposition  - [facility] Specific Patient Instructions  COVID 19 Nurse Triage Plan/Patient Instructions    Please be aware that novel coronavirus (COVID-19) may be circulating in the community. If you develop symptoms such as fever, cough, or SOB or if you have concerns about the presence of another infection including coronavirus (COVID-19), please contact your health care provider or visit [email or web address].       Virtual Visit with provider recommended. Reference Visit Selection Guide.    Thank you for taking steps to prevent the spread of this virus.  Limit your contact with others.  Wear a simple mask to cover your cough.  Wash your hands well and often.    Resources  [facility]: About COVID-19: [email or web address]  CDC: What to Do If You're Sick: www.cdc.gov/coronavirus/2019-ncov/about/steps-when-sick.html  CDC: Ending Home Isolation: www.cdc.gov/coronavirus/2019-ncov/hcp/disposition-in-home-patients.html   CDC: Caring for Someone: www.cdc.gov/coronavirus/2019-ncov/if-you-are-sick/care-for-someone.html   [facility]: Interim Guidance for Hospital Discharge to Home: www.health.[facility].[place]/diseases/coronavirus/hcp/hospdischarge.pdf  [facility] clinical trials (COVID-19 research studies): clinicalaffairs.[facility]-clinical-trials   Below are the COVID-19 hotlines at the [facility] ([facility]). Interpreters are available.   For health questions: Call 820-585-7420 or 1-983.335.9020 (7 a.m. to 7 p.m.)  For questions about schools and childcare: Call 969-471-3404 or 1-186.369.6062 (7 a.m. to 7 p.m.)    Protocols used: CORONAVIRUS (COVID-19) DIAGNOSED OR RKAICHEIH-M-WZ 5.16.20

## 2021-06-12 NOTE — PROGRESS NOTES
"[facility]  ENDOCRINOLOGY    Thyroid Note  8/7/2017    Eli Pineda, 1985, [id number]          Reason for visit      1. Hypothyroidism        HPI     Eli Pineda is a very pleasant 32 y.o. old female who presents for follow up.  SUMMARY:  Eli returns for her annual visit in f/u for hypothyroidism.  She reports that she has been out of her medication for about a month.  She states that there was a problem with the pharmacy and that they wouldn't bridge her medication for her.  Consequently, she needs to restart her medication.  She will also need labs.  She has been taking Levothyroxine 125 mcg daily, until her little mishap.  She denies any problems attributable to her neck, no dysphagia, dysphonia or odynophagia.       Past Medical History     Patient Active Problem List   Diagnosis     Colitis     Elevated C-reactive Protein     Nausea     Headache     Chest Tightness Or Heavy Pressure     Hypothyroidism       Family History       family history is not on file.    Social History      reports that she has never smoked. She does not have any smokeless tobacco history on file.      Review of Systems     Patient denies fatigue, weight changes, heat/cold intolerance, bowel/skin changes or CVS symptoms.   Remainder per HPI and per attached intake form.      Vital Signs     BP 92/68 (Patient Site: Right Arm, Patient Position: Sitting, Cuff Size: Adult Regular)  Pulse 78  Ht 5' 5.5\" (1.664 m)  Wt 183 lb 12.8 oz (83.4 kg)  BMI 30.12 kg/m2  Wt Readings from Last 3 Encounters:   08/02/17 183 lb 12.8 oz (83.4 kg)   05/12/16 159 lb 6.4 oz (72.3 kg)   08/25/15 178 lb (80.7 kg)       Physical Exam     General:  Normal, NIRD,appears euthyroid  Eyes:  Pupils equal, round and reactive to light; no proptosis, lid lag or  periorbital edema.  Thyroid:  Thyroid is normal.  No tenderness or bruit  Neck: No lymph nodes  Musculoskeletal:  Muscle strength grossly normal without evidence of wasting.  Heart:  Regular " rate and rhythm without murmur.  Lungs:  Clear to auscultation.  Abdomen: Soft, non-tender, no masses or organomegaly  Neuro: Patella Reflexes were normal.No tremors  Skin:  No acanthosis nigricans or vitiligo          Assessment     1. Hypothyroidism            Plan       Will restart pt's medication and we will recheck labs in 8 weeks.  Time spent with pt today: 25 min with >50% spent in counseling and coordination of care. F/u in 1 year.        [facility]  8/7/2017  1:59 PM      Lab Results     TSH   Date Value Ref Range Status   07/26/2017 6.24 (H) 0.30 - 5.00 uIU/mL Final     T3, Total   Date Value Ref Range Status   12/04/2014 113 45 - 175 ng/dL Final     Thyroid Peroxidase Ab   Date Value Ref Range Status   12/04/2014 1649.9 (H) 0.0 - 5.6 IU/mL Final     Thyroid Peroxidase Ab   Date Value Ref Range Status   12/04/2014 1649.9 (H) 0.0 - 5.6 IU/mL Final       No results found for: R5KMBOE    Imaging Results   Last thyroid ultrasound:  No results found for this or any previous visit.    Last thyroid nuclear scan:  No results found for this or any previous visit.    Current Medications     Outpatient Medications Prior to Visit   Medication Sig Dispense Refill     levothyroxine (SYNTHROID, LEVOTHROID) 125 MCG tablet TAKE 1 TABLET BY MOUTH EVERY DAY 90 tablet 3     NUVARING 0.12-0.015 mg/24 hr vaginal ring        No facility-administered medications prior to visit.

## 2021-06-14 NOTE — PROGRESS NOTES
"  Assessment & Plan     Cellulitis of left axilla    - cephalexin (KEFLEX) 500 MG capsule  Dispense: 28 capsule; Refill: 0  We will treat with cephalexin.  Counseled her on use of medication and side effects.  Continue with warm compresses.  Recommend ibuprofen or Aleve on a regular basis for the next 4 to 5 days.  Encouraged her to contact the clinic if symptoms are not improving by early next week or if new concerning symptoms develop.    [id number]     BMI:   Estimated body mass index is 35.07 kg/m  as calculated from the following:    Height as of this encounter: 5' 6\" (1.676 m).    Weight as of this encounter: 217 lb 4 oz (98.5 kg).         No follow-ups on file.    Kemi Molina MD  [facility]     Eli Pineda is 35 y.o. and presents to clinic today for the following health issues   HPI   She is seen today for concern of a painful lump in the left armpit.  This started about 3 days ago.  States that the lump has remained the same.  It is tender to the touch.  It has not significantly increased in size.  She does not notice any redness, warmth or drainage.  She does not see any pimples or signs of ingrown hairs.  She has not had fevers or chills.  She has felt a little bit more tired.  She has been applying warm compresses.          Review of Systems   All other systems reviewed and are negative.          Objective    /74 (Patient Site: Right Arm, Patient Position: Sitting, Cuff Size: Adult Large)   Pulse 88   Temp 99  F (37.2  C) (Temporal)   Ht 5' 6\" (1.676 m)   Wt 217 lb 4 oz (98.5 kg)   LMP 01/13/2021   SpO2 98%   BMI 35.07 kg/m    Body mass index is 35.07 kg/m .  Physical Exam   Constitutional: She appears well-developed and well-nourished.   Skin: Skin is warm and dry.   There is soft tissue swelling present in the left axilla with associated tenderness to palpation.  No fluctuance is present.  There is no erythema or warmth of the area of soft tissue " swelling

## 2021-06-14 NOTE — PATIENT INSTRUCTIONS - HE
Start antibiotic.    Take ibuprofen or Aleve on a regular basis for next 5 days    Continue to use warm compresses.    Notify us if not improving by early next week.

## 2021-06-15 NOTE — PROGRESS NOTES
Assessment and Plan:     1. Acute pain of right shoulder  Differentials include rotator cuff tendinitis, impingement, bursitis.  Will refer to orthopedics for further evaluation possible cortisone injection.  Discussed symptomatic treatment including rest and ice.  - XR Shoulder Right 2 or More VWS; Future  - Ambulatory referral to Orthopedics    2. Acne vulgaris  Patient has more of a cystic acne.  Will start Spironolactone 25 mg daily.  Will check BMP.  Will refer to dermatology for further evaluation and continuation of treatment.  - Ambulatory referral to Dermatology  - spironolactone (ALDACTONE) 25 MG tablet; Take 1 tablet (25 mg total) by mouth daily.  Dispense: 30 tablet; Refill: 0    3. Medication management  - Basic Metabolic Panel    4. Need for vaccination  - Influenza, Seasonal,Quad Inj, 36+ MOS  - Tdap vaccine greater than or equal to 8yo IM  Obtained a release form to obtain Pap smear results from [facility].  She is content with the plan.      Subjective:     Eli is a 32 y.o. female presenting to the clinic for multiple concerns.  Patient complains of right shoulder pain for 3 weeks.  She denies any injury.  She complains of a grinding and catching sensation.  She has an intermittent intermittent sharp sensation when she tries to put on her coat or abducts her arm.  She does have a history of similar symptoms in the past.  4 years ago, she was seen at [facility] where she was diagnosed with bursitis.  She denies any recent exercise or strenuous activity.  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  She has been taking over-the-counter Tylenol and Advil.  Patient is also concerned of acne present on her chin.  She has a history of taking an antibiotic in the past.  She has had acne on her chin for the past 2 months.  She states it is painful.  She is interested in starting a medication.    Review of Systems: A complete 14 point review of systems was obtained and is negative or as stated in the  "history of present illness.    Social History     Social History     Marital status:      Spouse name: N/A     Number of children: N/A     Years of education: N/A     Occupational History     Not on file.     Social History Main Topics     Smoking status: Never Smoker     Smokeless tobacco: Never Used     Alcohol use Not on file     Drug use: Not on file     Sexual activity: Not on file     Other Topics Concern     Not on file     Social History Narrative       Active Ambulatory Problems     Diagnosis Date Noted     Colitis      Elevated C-reactive Protein      Nausea      Headache      Chest Tightness Or Heavy Pressure      Hypothyroidism 12/17/2014     Resolved Ambulatory Problems     Diagnosis Date Noted     No Resolved Ambulatory Problems     No Additional Past Medical History       No family history on file.    Objective:     /70  Pulse 66  Ht 5' 5.5\" (1.664 m)  Wt 180 lb 12.8 oz (82 kg)  BMI 29.63 kg/m2    Patient is alert, in no obvious distress.   Skin: Warm, dry.  She has a small amount of cystic acne on her chin.   HEENT:  Head normocephalic, atraumatic.  Eyes normal.  Ears normal.  Nose patent, mucosa pink.  Oropharynx mucosa pink.  No lesions or tonsillar enlargement.   Neck: Supple, no lymphadenopathy.   Lungs:  Clear to auscultation. Respirations even and unlabored.  No wheezing or rales noted.   Heart:  Regular rate and rhythm.  No murmurs.   Musculoskeletal: She has full range of motion of her right arm.  There are no areas of erythema, ecchymosis, signs of trauma.  Muscle strength equal +5 of 5.  Neer's impingement is positive.  She is tender to palpation of her posterior rotator cuff.    LABORATORY: I ordered and personally reviewed right shoulder x-rays showing no obvious fracture.  Will have radiology review.                "

## 2021-06-15 NOTE — TELEPHONE ENCOUNTER
Please authorize more refills of levothyroxine (SYNTHROID, LEVOTHROID) 125 MCG tablet.  Patient is completely out.     [facility]       Natalya @ [phone]

## 2021-06-16 PROBLEM — F33.0 MILD EPISODE OF RECURRENT MAJOR DEPRESSIVE DISORDER (H): Status: ACTIVE | Noted: 2020-05-18

## 2021-06-16 PROBLEM — J45.909 ASTHMA: Status: ACTIVE | Noted: 2021-03-30

## 2021-06-16 PROBLEM — K21.9 GASTROESOPHAGEAL REFLUX DISEASE: Status: ACTIVE | Noted: 2021-03-30

## 2021-06-16 PROBLEM — N81.4 UTEROVAGINAL PROLAPSE: Status: ACTIVE | Noted: 2021-03-30

## 2021-06-16 PROBLEM — F90.2 ATTENTION DEFICIT HYPERACTIVITY DISORDER (ADHD), COMBINED TYPE: Status: ACTIVE | Noted: 2020-05-18

## 2021-06-16 PROBLEM — F41.9 ANXIETY: Status: ACTIVE | Noted: 2020-05-18

## 2021-06-16 PROBLEM — N39.46 MIXED STRESS AND URGE URINARY INCONTINENCE: Status: ACTIVE | Noted: 2021-03-30

## 2021-06-16 NOTE — PROGRESS NOTES
Eli Pineda is a 36 y.o. female who is being evaluated via a billable video visit.      How would you like to obtain your AVS? MyChart.  If dropped from the video visit, the video invitation should be resent by: Text to cell phone: [phone]  Will anyone else be joining your video visit? No      Video Start Time: 1420      Reason for visit      1. Hypothyroidism, unspecified type    2. Hypothyroidism        HPI     Eli Pineda is a very pleasant 36 y.o. old female who presents for follow up.  SUMMARY:  She was diagnosed on a routine screening test for ovum donation at a fertility clinic. She had symptoms but assumed they were due to postpartum.. She has never had thyroid problems in the past.Her mother is hypothyroid. She never had any preceeding hyperthyroid symptoms.    TODAY:    Natalya is contacted today via Video Visit in f/u for Hypothyroidism. She reports that she is feeling well. She was without medication for about 2 and a half weeks and thus, her TSH was 4.30, higher than usual. Her fT4 was 0.8. She has been back on her medication for several weeks now. She takes 125 mcg of Levothyroxine daily. She is having no problems referable to her neck at present.       Lab 3/04/21  TSH: 4.30  T4, Free: 0.8  Potassium: 4.6  Creatinine: 0.74  Vitamin D: X      Past Medical History     Patient Active Problem List   Diagnosis     Colitis     Elevated C-reactive Protein     Nausea     Headache     Chest Tightness Or Heavy Pressure     Hypothyroidism     Anxiety     Attention deficit hyperactivity disorder (ADHD), combined type     Mild episode of recurrent major depressive disorder (H)     Asthma     Gastroesophageal reflux disease     Mixed stress and urge urinary incontinence     Uterovaginal prolapse       Family History       family history is not on file.    Social History      reports that she has never smoked. She has never used smokeless tobacco.      Review of Systems     Patient denies fatigue,  weight changes, heat/cold intolerance, bowel/skin changes or CVS symptoms.   Remainder per HPI and per attached intake form.      Vital Signs     There were no vitals taken for this visit.  Wt Readings from Last 3 Encounters:   01/29/21 217 lb 4 oz (98.5 kg)   03/06/20 204 lb 4.8 oz (92.7 kg)   03/07/19 186 lb 6.4 oz (84.6 kg)       Physical Exam           Assessment     1. Hypothyroidism, unspecified type    2. Hypothyroidism            Plan       Natalya is bio-chemically and physically euthyroid. She will remain on her current dose of Levothyroxine and f/u with me in 1 year, sooner with problems or concerns.       Cuca Duff   Endocrinology  3/31/2021  12:02 PM            Lab Results     TSH   Date Value Ref Range Status   03/04/2021 4.30 0.30 - 5.00 uIU/mL Final     T3, Total   Date Value Ref Range Status   12/04/2014 113 45 - 175 ng/dL Final     Thyroid Peroxidase Ab   Date Value Ref Range Status   12/04/2014 1,649.9 (H) 0.0 - 5.6 IU/mL Final     Thyroid Peroxidase Ab   Date Value Ref Range Status   12/04/2014 1,649.9 (H) 0.0 - 5.6 IU/mL Final       No results found for: [id number]    Imaging Results   Last thyroid ultrasound:  No results found for this or any previous visit.    Last thyroid nuclear scan:  No results found for this or any previous visit.    Current Medications     Outpatient Medications Prior to Visit   Medication Sig Dispense Refill     LEVONORGESTREL (MIRENA UTRN) by Intrauterine route.       levothyroxine (SYNTHROID, LEVOTHROID) 125 MCG tablet TAKE 1 TABLET BY MOUTH EVERY DAY 30 tablet 0     No facility-administered medications prior to visit.            Video-Visit Details    Type of service:  Video Visit    Video End Time (time video stopped): 1440  Originating Location (pt. Location): Home    Distant Location (provider location):  [facility]     Platform used for Video Visit: Matt

## 2021-06-20 ENCOUNTER — HEALTH MAINTENANCE LETTER (OUTPATIENT)
Age: 36
End: 2021-06-20

## 2021-06-23 NOTE — TELEPHONE ENCOUNTER
Neha Team    In regards of:   levothyroxine (SYNTHROID, LEVOTHROID) 125 MCG tablet    Please send as: 90 day supply    Patient is OUT of medication.    Please send to: [facility] [id number] [place] [address]    Please bridge RX until Seen.  Date: 2/28/2019 Status: Dwight   Time: 7:45 AM Length: 15   Visit Type: LAB [[id number]] Copay: $0.00   Provider: [facility] Department: [facility]   Referring Provider: LUZ MARIA PHILIP CSN: [id number]   Notes: labs per neha jacob     Date: 3/7/2019 Status: Dwight   Time: 11:40 AM Length: 20   Visit Type: OFFICE VISIT [[id number]] Copay: $25.00   Provider: Cuca Duff NP Department: [facility]   Referring Provider: LUZ MARIA PHILIP CSN: [id number]   Notes: thyroid f/u

## 2021-06-23 NOTE — TELEPHONE ENCOUNTER
[facility] : [facility] [id number] [place] [facility] [place] [address] [facility]

## 2021-06-24 NOTE — PROGRESS NOTES
[facility]  ENDOCRINOLOGY    Thyroid Note  3/10/2019    Eli Pineda, 1985, [id number]          Reason for visit      1. Hypothyroidism, unspecified type    2. Hypothyroidism        HPI     Eli Pineda is a very pleasant 34 y.o. old female who presents for follow up.  SUMMARY:  She was diagnosed on a routine screening test for ovum donation at a fertility clinic. She had symptoms but assumed they were due to postpartum.. She has never had thyroid problems ni the past.Her mother is hypothyroid. She is 6 months postpartum. She never had any preceeding hyperthyroid symptoms.  Current symptoms/concerns:   Weight gain, inability to lose weight, fatigue, hair loss, poor concentration, memory loss,night sweats, low mood, low energy, poor sleep, poor focus. Tsh level on  was 51.779uIU/ml  Patient has used no thyroid meds in the past.  Records from referring provider and other sources have also been reviewed and incorporated into decision-making.  Past Medical History: Colitis, obesity, 2 pregnancies with normal - second baby was 10lb 13oz-fetal macrosomia, h/o ovariectomy ; tonsillectomy ; wisdom teeth removal ;  Family and Social history: Reviewed attached intake form  Review of Systems: Reviewed attached intake remainder negative  TODAY:  Eli returns to clinic in f/u for hypothyroidism.  She is taking Levothyroxine 125 mcg daily on an empty stomach. Her current TSH is 1.42 and Free T 4 is 1.1. While these levels are within normal range, she feels that she is lacking in energy.  She is having no problems swallowing or referable to her neck.     Past Medical History     Patient Active Problem List   Diagnosis     Colitis     Elevated C-reactive Protein     Nausea     Headache     Chest Tightness Or Heavy Pressure     Hypothyroidism       Family History       family history is not on file.    Social History      reports that  has never smoked. she has never used smokeless  "tobacco.      Review of Systems     Patient denies fatigue, weight changes, heat/cold intolerance, bowel/skin changes or CVS symptoms.   Remainder per HPI and per attached intake form.      Vital Signs     /78 (Patient Site: Right Arm, Patient Position: Sitting, Cuff Size: Adult Regular)   Pulse 78   Ht 5' 5.5\" (1.664 m)   Wt 186 lb 6.4 oz (84.6 kg)   BMI 30.55 kg/m    Wt Readings from Last 3 Encounters:   03/07/19 186 lb 6.4 oz (84.6 kg)   01/19/18 180 lb 12.8 oz (82 kg)   08/02/17 183 lb 12.8 oz (83.4 kg)       Physical Exam     General:  Normal, NIRD,appears euthyroid  Eyes:  Pupils equal, round and reactive to light; no proptosis, lid lag or  periorbital edema.  Thyroid:  Thyroid is normal.  No tenderness or bruit  Neck: No lymph nodes  Musculoskeletal:  Muscle strength grossly normal without evidence of wasting.  Heart:  Regular rate and rhythm without murmur.  Lungs:  Clear to auscultation.  Abdomen: Soft, non-tender, no masses or organomegaly  Neuro: Patella Reflexes were normal.No tremors  Skin:  No acanthosis nigricans or vitiligo        Assessment     1. Hypothyroidism, unspecified type    2. Hypothyroidism            Plan     I have instructed her to increase her Levothyroxine by one dosage a week. We will see what happens with her energy level. We will run labs again in 8 weeks.  F/u with me in 1 year, sooner with problems.        Cuca FORBES Endocrinology  3/10/2019  8:47 AM      Lab Results     TSH   Date Value Ref Range Status   02/28/2019 1.42 0.30 - 5.00 uIU/mL Final     T3, Total   Date Value Ref Range Status   12/04/2014 113 45 - 175 ng/dL Final     Thyroid Peroxidase Ab   Date Value Ref Range Status   12/04/2014 1,649.9 (H) 0.0 - 5.6 IU/mL Final     Thyroid Peroxidase Ab   Date Value Ref Range Status   12/04/2014 1,649.9 (H) 0.0 - 5.6 IU/mL Final       No results found for: [id number]    Imaging Results   Last thyroid ultrasound:  No results found for this or any previous " visit.    Last thyroid nuclear scan:  No results found for this or any previous visit.    Current Medications     Outpatient Medications Prior to Visit   Medication Sig Dispense Refill     LEVONORGESTREL (MIRENA UTRN) by Intrauterine route.       levothyroxine (SYNTHROID, LEVOTHROID) 125 MCG tablet TAKE 1 TABLET BY MOUTH EVERY DAY 50 tablet 0     spironolactone (ALDACTONE) 25 MG tablet Take 1 tablet (25 mg total) by mouth daily. 30 tablet 0     No facility-administered medications prior to visit.

## 2021-07-03 NOTE — ADDENDUM NOTE
Addendum Note by Ainsley Kenyon RN at 1/23/2019  2:43 PM     Author: Ainsley Kenyon RN Service: -- Author Type: Registered Nurse    Filed: 1/23/2019  2:43 PM Encounter Date: 1/22/2019 Status: Signed    : Ainsley Kenyon RN (Registered Nurse)    Addended by: AINSLEY KENYON on: 1/23/2019 02:43 PM        Modules accepted: Orders

## 2021-07-09 ENCOUNTER — OFFICE VISIT - HEALTHEAST (OUTPATIENT)
Dept: FAMILY MEDICINE | Facility: CLINIC | Age: 36
End: 2021-07-09

## 2021-07-09 DIAGNOSIS — E03.9 HYPOTHYROIDISM, UNSPECIFIED TYPE: ICD-10-CM

## 2021-07-09 DIAGNOSIS — R53.83 FATIGUE, UNSPECIFIED TYPE: ICD-10-CM

## 2021-07-09 DIAGNOSIS — Z00.00 ROUTINE GENERAL MEDICAL EXAMINATION AT A HEALTH CARE FACILITY: ICD-10-CM

## 2021-07-09 DIAGNOSIS — M25.50 POLYARTHRALGIA: ICD-10-CM

## 2021-07-09 DIAGNOSIS — K21.9 GASTROESOPHAGEAL REFLUX DISEASE, UNSPECIFIED WHETHER ESOPHAGITIS PRESENT: ICD-10-CM

## 2021-07-09 DIAGNOSIS — Z13.220 LIPID SCREENING: ICD-10-CM

## 2021-07-09 DIAGNOSIS — F33.0 MILD EPISODE OF RECURRENT MAJOR DEPRESSIVE DISORDER (H): ICD-10-CM

## 2021-07-09 DIAGNOSIS — J45.20 MILD INTERMITTENT ASTHMA WITHOUT COMPLICATION: ICD-10-CM

## 2021-07-09 DIAGNOSIS — E66.01 MORBID OBESITY (H): ICD-10-CM

## 2021-07-09 LAB
ANION GAP SERPL CALCULATED.3IONS-SCNC: 12 MMOL/L (ref 5–18)
BUN SERPL-MCNC: 9 MG/DL (ref 8–22)
CALCIUM SERPL-MCNC: 9 MG/DL (ref 8.5–10.5)
CHLORIDE BLD-SCNC: 104 MMOL/L (ref 98–107)
CHOLEST SERPL-MCNC: 197 MG/DL
CO2 SERPL-SCNC: 22 MMOL/L (ref 22–31)
CREAT SERPL-MCNC: 0.75 MG/DL (ref 0.6–1.1)
ERYTHROCYTE [DISTWIDTH] IN BLOOD BY AUTOMATED COUNT: 12.5 % (ref 11–14.5)
ERYTHROCYTE [SEDIMENTATION RATE] IN BLOOD BY WESTERGREN METHOD: 7 MM/HR (ref 0–20)
FASTING STATUS PATIENT QL REPORTED: YES
GFR SERPL CREATININE-BSD FRML MDRD: >60 ML/MIN/1.73M2
GLUCOSE BLD-MCNC: 86 MG/DL (ref 70–125)
HCT VFR BLD AUTO: 41.3 % (ref 35–47)
HDLC SERPL-MCNC: 45 MG/DL
HGB BLD-MCNC: 13.9 G/DL (ref 12–16)
LDLC SERPL CALC-MCNC: 133 MG/DL
MCH RBC QN AUTO: 31.3 PG (ref 27–34)
MCHC RBC AUTO-ENTMCNC: 33.7 G/DL (ref 32–36)
MCV RBC AUTO: 93 FL (ref 80–100)
PLATELET # BLD AUTO: 247 THOU/UL (ref 140–440)
PMV BLD AUTO: 9.3 FL (ref 7–10)
POTASSIUM BLD-SCNC: 4.6 MMOL/L (ref 3.5–5)
RBC # BLD AUTO: 4.44 MILL/UL (ref 3.8–5.4)
RHEUMATOID FACT SERPL-ACNC: <15 IU/ML (ref 0–30)
SODIUM SERPL-SCNC: 138 MMOL/L (ref 136–145)
T4 FREE SERPL-MCNC: 1.2 NG/DL (ref 0.7–1.8)
TRIGL SERPL-MCNC: 95 MG/DL
TSH SERPL DL<=0.005 MIU/L-ACNC: 0.93 UIU/ML (ref 0.3–5)
VIT B12 SERPL-MCNC: 340 PG/ML (ref 213–816)
WBC: 5.6 THOU/UL (ref 4–11)

## 2021-07-09 RX ORDER — PANTOPRAZOLE SODIUM 40 MG/1
40 TABLET, DELAYED RELEASE ORAL DAILY
Qty: 90 TABLET | Refills: 2 | Status: SHIPPED | OUTPATIENT
Start: 2021-07-09 | End: 2022-03-29

## 2021-07-09 RX ORDER — SPIRONOLACTONE 50 MG/1
50 TABLET, FILM COATED ORAL DAILY
Status: SHIPPED | COMMUNITY
Start: 2021-07-09 | End: 2022-03-29

## 2021-07-09 ASSESSMENT — MIFFLIN-ST. JEOR: SCORE: 1716.68

## 2021-07-09 ASSESSMENT — PATIENT HEALTH QUESTIONNAIRE - PHQ9: SUM OF ALL RESPONSES TO PHQ QUESTIONS 1-9: 4

## 2021-07-10 VITALS
DIASTOLIC BLOOD PRESSURE: 78 MMHG | OXYGEN SATURATION: 98 % | BODY MASS INDEX: 34.84 KG/M2 | WEIGHT: 222 LBS | SYSTOLIC BLOOD PRESSURE: 100 MMHG | HEIGHT: 67 IN | HEART RATE: 77 BPM

## 2021-07-10 LAB
25(OH)D3 SERPL-MCNC: 24.2 NG/ML (ref 30–80)
ANA SER QL: 0.9 U
B BURGDOR IGG+IGM SER QL: 0.03 INDEX VALUE
GLIADIN IGA SER-ACNC: 0.6 U/ML
GLIADIN IGG SER-ACNC: <0.4 U/ML
IGA SERPL-MCNC: 132 MG/DL (ref 65–400)
TTG IGA SER-ACNC: 0.3 U/ML
TTG IGG SER-ACNC: <0.6 U/ML

## 2021-07-10 ASSESSMENT — PATIENT HEALTH QUESTIONNAIRE - PHQ9: SUM OF ALL RESPONSES TO PHQ QUESTIONS 1-9: 4

## 2021-07-10 NOTE — PROGRESS NOTES
Progress Notes by Angela Gates CNP at 7/9/2021  7:30 AM     Author: Angela Gates CNP Service: -- Author Type: Nurse Practitioner    Filed: 7/10/2021  9:24 AM Encounter Date: 7/9/2021 Status: Signed    : Angela Gates CNP (Nurse Practitioner)       Assessment and Plan:    1. Routine general medical examination at a health care facility  Recommend consuming a healthy diet and exercising.  She declines hepatitis C and HIV screening.  - Pneumococcal polysaccharide vaccine 23-valent greater than or equal to 3yo subcutaneous/IM    2. Lipid screening  - Lipid Cascade    3. Morbid obesity (H)  Discussed weight loss goals.  I encouraged her to integrate strength training.  Discussed referral to bariatric clinic for weight loss.  Patient may consider this.  The following high BMI interventions were performed this visit: encouragement to exercise and lifestyle education regarding diet    4. Gastroesophageal reflux disease, unspecified whether esophagitis present  We will start pantoprazole 40 mg daily.  Educated on its indications and side effects.  Educated on foods to avoid and remain upright for 2 hours after eating.  - pantoprazole (PROTONIX) 40 MG tablet; Take 1 tablet (40 mg total) by mouth daily.  Dispense: 90 tablet; Refill: 2    5. Fatigue, unspecified type  6. Polyarthralgia  We will rule out anemia, diabetes, inflammation, autoimmune disease.  Patient has thyroid labs ordered by her endocrinologist.  Further plans pending the results.  May consider referral to rheumatology.  - HM2(CBC w/o Differential)  - Erythrocyte Sedimentation Rate  - Lyme Antibody Cascade  - Antinuclear Antibody (GEOFF) Cascade  - Rheumatoid Factor Quant  - Celiac(Gluten)Antibody Panel ($$$)    7. Mild episode of recurrent major depressive disorder (H)  Obtained PHQ-9 score of 4.  This is controlled.  She is not interested in medication.    8. Mild intermittent asthma without complication  Obtained ACT score of 25.  This is  controlled.    9. Hypothyroidism, unspecified type  She has thyroid labs ordered by her endocrinologist who manages her hypothyroidism.  She is content with the plan.      Subjective:     Eli is a 36 y.o. female presenting to the clinic for a female physical.     LMP: occasional spotting with Mirena   Hx of abnormal pap smear: none   Last pap smear: due in November at [facility] [place], operated by [facility]   Perform self-breast exams: yes   Vaginal discharge or irritation: none   Sexually active: yes, in a relationship for 3 years   Contraception: IUD   Concerns for STDs: none   Previous pregnancies:two pregnancies (vaginal deliveries)   Kids are 8 (boy) 7 (girl)     Patient sees endocrinology for hypothyroidism.  She is taking levothyroxine 125 mcg daily.  Patient gained weight during the COVID-19 pandemic.  She has had difficulty losing the weight.  She is quite frustrated with this.  She is consuming a healthy diet and states she has active.  She does have difficulty sleeping and wakes up at 3:00 in the morning.  She does suffer from acid reflux and feels as though she has a lump in her throat.  She has tried over-the-counter Prilosec and Zantac with no relief.  She is not currently taking any medication.  Patient is also concerned of polyarthralgias.  She has been experiencing joint pain within her fingers, right foot, wrist, ankles.  This has been ongoing for 1-1/2 years.  She denies any tick bites.  She has not noticed any rashes.  She denies fatigue.  She does have a family history of Crohn's and thyroid disease.  Patient sees dermatology for acne.  She is treated with spironolactone.  She has a history of depression and states her mood is stable.  She denies thoughts of suicide.  She has mild intermittent asthma which is well controlled.    Review of systems:  I performed a 10 point review of systems.  All pertinent positives and negatives are noted in the HPI. All others are negative.     Allergies   Allergen Reactions   ? Tawanda  [Gadopentetate Dimeglumine] Itching and Rash       Current Outpatient Medications on File Prior to Visit   Medication Sig Dispense Refill   ? LEVONORGESTREL (MIRENA UTRN) by Intrauterine route.     ? levothyroxine (SYNTHROID, LEVOTHROID) 125 MCG tablet TAKE 1 TABLET BY MOUTH EVERY DAY 90 tablet 3   ? spironolactone (ALDACTONE) 50 MG tablet Take 50 mg by mouth daily.       No current facility-administered medications on file prior to visit.        Social History     Socioeconomic History   ? Marital status:      Spouse name: Not on file   ? Number of children: Not on file   ? Years of education: Not on file   ? Highest education level: Not on file   Occupational History   ? Not on file   Social Needs   ? Financial resource strain: Not on file   ? Food insecurity     Worry: Not on file     Inability: Not on file   ? Transportation needs     Medical: Not on file     Non-medical: Not on file   Tobacco Use   ? Smoking status: Never Smoker   ? Smokeless tobacco: Never Used   Substance and Sexual Activity   ? Alcohol use: Not on file   ? Drug use: Not on file   ? Sexual activity: Not on file   Lifestyle   ? Physical activity     Days per week: Not on file     Minutes per session: Not on file   ? Stress: Not on file   Relationships   ? Social connections     Talks on phone: Not on file     Gets together: Not on file     Attends Jainism service: Not on file     Active member of club or organization: Not on file     Attends meetings of clubs or organizations: Not on file     Relationship status: Not on file   ? Intimate partner violence     Fear of current or ex partner: Not on file     Emotionally abused: Not on file     Physically abused: Not on file     Forced sexual activity: Not on file   Other Topics Concern   ? Not on file   Social History Narrative   ? Not on file       No past medical history on file.    No family history on file.    Past Surgical History:   Procedure Laterality Date   ? SD REMOVAL OF  OVARIAN CYST(S)      Description: Ovarian Cystectomy Left;  Recorded: 10/22/2014;   ? MN REMOVAL OF TONSILS,<13 Y/O      Description: Tonsillectomy;  Recorded: 01/15/2008;       Objective:     Vitals:    07/09/21 0730   BP: 100/78   Pulse: 77   SpO2: 98%       Patient is alert, no obvious distress.   Skin: Warm, dry.  No rashes or lesions. Skin turgor rapid return.   HEENT:  Eyes normal.  Ears normal.  Nose patent, mucosa pink.  Oropharynx mucosa pink, no lesions or tonsil enlargement.   Neck:  Supple, without lymphadenopathy, bruits, JVD. Thyroid normal texture and size.    Lungs:  Clear to auscultation.  No wheezing, rales noted.  Respirations even and unlabored.   Heart:  Regular rate and rhythm.  No murmurs.   Breasts:  Normal.  No surrounding adenopathy.   Abdomen: Soft, nontender.  No organomegaly.  Bowel sounds normoactive.  No guarding or masses noted.   :  deferred  Musculoskeletal:  Full ROM of extremities.  Muscle strength equal +5/5.   Neurological:  Cranial nerves 2-12 intact.

## 2021-07-11 PROBLEM — E66.01 MORBID OBESITY (H): Status: ACTIVE | Noted: 2021-07-09

## 2021-07-12 ASSESSMENT — ASTHMA QUESTIONNAIRES: ACT_TOTALSCORE: 25

## 2021-10-11 ENCOUNTER — HEALTH MAINTENANCE LETTER (OUTPATIENT)
Age: 36
End: 2021-10-11

## 2021-11-09 ENCOUNTER — TRANSFERRED RECORDS (OUTPATIENT)
Dept: HEALTH INFORMATION MANAGEMENT | Facility: CLINIC | Age: 36
End: 2021-11-09

## 2022-03-18 ENCOUNTER — TELEPHONE (OUTPATIENT)
Dept: ENDOCRINOLOGY | Facility: CLINIC | Age: 37
End: 2022-03-18
Payer: COMMERCIAL

## 2022-03-18 DIAGNOSIS — E03.9 HYPOTHYROIDISM, UNSPECIFIED TYPE: Primary | ICD-10-CM

## 2022-03-18 NOTE — TELEPHONE ENCOUNTER
Patient has upcoming virtual appointment with Cornelia Duff NP in Endocrinology on 3/29/22.  The patient has not completed ordered labs.  Lab orders are in the chart.    Please call patient to schedule a lab appointment at least 48 hours prior to clinic appointment or have patient reschedule clinic appointment with a lab appointment preferably 1 week prior.Thank you.

## 2022-03-18 NOTE — PROGRESS NOTES
Natalya is a 37 year old who is being evaluated via a billable video visit.      How would you like to obtain your AVS? MyChart  If the video visit is dropped, the invitation should be resent by: Text to cell phone: [phone]  Will anyone else be joining your video visit? No      Video Start Time: 1030      [facility] ENDOCRINOLOGY    Thyroid Note  3/29/2022    Eli Pineda, 1985, [id number]          Reason for visit      1. Hypothyroidism, unspecified type        HPI     Eli Pineda is a very pleasant 37 year old old female who presents for follow up.  SUMMARY:    Eli is contacted today via Video Visit in f/u for Hypothyroidism. She reports today that she has had some dysmenorrhea recently. She is utilizing a Mirena and notes that normally, she has some spotting at the beginning of the month. For the last two months, she has had a regular 5-7 day period in the middle of the month. She has been having occasional headaches that are not normal for her. She has limited her screen time as well as limiting her caffeine and drinking more water, and hasn't been successful in eliminating them. Current TSH is quite low for her at 0.09 and her Free T 4 was 1.14. She is taking Levothyroxine 125 mcg mostly every day. She sometimes misses, but isn't quite sure how often this happens. Vit D was recently found to be low at 19. She reports that she is trying to get her supplements daily, but again, this doesn't always happen.     Past Medical History     Patient Active Problem List   Diagnosis     Colitis     Elevated C-reactive Protein     Headache     Hypothyroidism     Anxiety     Attention deficit hyperactivity disorder (ADHD), combined type     Mild episode of recurrent major depressive disorder (H)     Asthma     Gastroesophageal reflux disease     Mixed stress and urge urinary incontinence     Uterovaginal prolapse     Obesity (BMI 35.0-39.9) with comorbidity (H)     Diarrhea     Noninfectious  gastroenteritis     Nonspecific abdominal pain       Family History       family history includes Thyroid Disease in her mother.    Social History      reports that she has never smoked. She has never used smokeless tobacco. She reports current alcohol use of about 4.0 standard drinks of alcohol per week. She reports that she does not use drugs.      Review of Systems     Patient denies fatigue, weight changes, heat/cold intolerance, bowel/skin changes or CVS symptoms.   Remainder per HPI and per attached intake form.      Vital Signs     There were no vitals taken for this visit.  Wt Readings from Last 3 Encounters:   07/09/21 100.7 kg (222 lb)   01/29/21 98.5 kg (217 lb 4 oz)   03/06/20 92.7 kg (204 lb 4.8 oz)       Physical Exam     Constitutional:  Well developed, Well nourished  HENT:  Normocephalic,   Neck: normal in appearance  Eyes:  PERRL, Conjunctiva pink  Respiratory:  No respiratory distress  Skin: No acanthosis nigricans, lipoatrophy or lipodystrophy  Neurologic:  Alert & oriented x 3, nonfocal  Psychiatric:  Affect, Mood, Insight appropriate          Assessment     1. Hypothyroidism, unspecified type            Plan     With discussion, she would like to wait for another month and we will retest her levels again. If things are still awry, we will make changes to her dosage. Refills provided today. She will f/u with me next year.         Cuca Duff NP  [facility]  3/29/2022  11:21 AM        Lab Results     TSH   Date Value Ref Range Status   03/24/2022 0.09 (L) 0.30 - 5.00 uIU/mL Final     No components found for: THYROIDAB    No results found for: [id number]    Imaging Results   Last thyroid ultrasound:  No results found for this or any previous visit.      Last thyroid nuclear scan:  No results found for this or any previous visit.      Current Medications     Outpatient Medications Prior to Visit   Medication Sig Dispense Refill     clindamycin (CLEOCIN T) 1 % external lotion clindamycin 1 %  lotion       LEVONORGESTREL (MIRENA UTRN) [LEVONORGESTREL (MIRENA UTRN)] by Intrauterine route.       levothyroxine (SYNTHROID, LEVOTHROID) 125 MCG tablet [LEVOTHYROXINE (SYNTHROID, LEVOTHROID) 125 MCG TABLET] TAKE 1 TABLET BY MOUTH EVERY DAY 90 tablet 3     pantoprazole (PROTONIX) 40 MG tablet [PANTOPRAZOLE (PROTONIX) 40 MG TABLET] Take 1 tablet (40 mg total) by mouth daily. 90 tablet 2     spironolactone (ALDACTONE) 50 MG tablet [SPIRONOLACTONE (ALDACTONE) 50 MG TABLET] Take 50 mg by mouth daily.       No facility-administered medications prior to visit.         Video-Visit Details    Type of service:  Video Visit    Video End Time: 1050    Originating Location (pt. Location): Home    Distant Location (provider location):  [facility]     Platform used for Video Visit: Mohinder    Date of last OV: 3/30/21  Reason for Visit: Hypothyroidism

## 2022-03-24 ENCOUNTER — LAB (OUTPATIENT)
Dept: LAB | Facility: CLINIC | Age: 37
End: 2022-03-24
Payer: COMMERCIAL

## 2022-03-24 DIAGNOSIS — E03.9 HYPOTHYROIDISM, UNSPECIFIED TYPE: ICD-10-CM

## 2022-03-24 LAB
CREAT SERPL-MCNC: 0.79 MG/DL (ref 0.6–1.1)
GFR SERPL CREATININE-BSD FRML MDRD: >90 ML/MIN/1.73M2
T4 FREE SERPL-MCNC: 1.14 NG/DL (ref 0.7–1.8)
TSH SERPL DL<=0.005 MIU/L-ACNC: 0.09 UIU/ML (ref 0.3–5)

## 2022-03-24 PROCEDURE — 84443 ASSAY THYROID STIM HORMONE: CPT

## 2022-03-24 PROCEDURE — 84439 ASSAY OF FREE THYROXINE: CPT

## 2022-03-24 PROCEDURE — 82306 VITAMIN D 25 HYDROXY: CPT

## 2022-03-24 PROCEDURE — 82565 ASSAY OF CREATININE: CPT

## 2022-03-24 PROCEDURE — 36415 COLL VENOUS BLD VENIPUNCTURE: CPT

## 2022-03-25 LAB — DEPRECATED CALCIDIOL+CALCIFEROL SERPL-MC: 19 UG/L (ref 20–75)

## 2022-03-29 ENCOUNTER — VIRTUAL VISIT (OUTPATIENT)
Dept: ENDOCRINOLOGY | Facility: CLINIC | Age: 37
End: 2022-03-29
Payer: COMMERCIAL

## 2022-03-29 DIAGNOSIS — E03.9 HYPOTHYROIDISM, UNSPECIFIED TYPE: Primary | ICD-10-CM

## 2022-03-29 PROBLEM — R10.9 NONSPECIFIC ABDOMINAL PAIN: Status: ACTIVE | Noted: 2021-11-09

## 2022-03-29 PROBLEM — R19.7 DIARRHEA: Status: ACTIVE | Noted: 2022-03-29

## 2022-03-29 PROCEDURE — 99214 OFFICE O/P EST MOD 30 MIN: CPT | Mod: 95 | Performed by: NURSE PRACTITIONER

## 2022-03-29 RX ORDER — LEVOTHYROXINE SODIUM 125 UG/1
TABLET ORAL
Qty: 90 TABLET | Refills: 3 | Status: SHIPPED | OUTPATIENT
Start: 2022-03-29 | End: 2023-03-28

## 2022-03-29 RX ORDER — CLINDAMYCIN PHOSPHATE 10 UG/ML
LOTION TOPICAL
COMMUNITY
Start: 2020-09-02

## 2022-05-05 ENCOUNTER — LAB (OUTPATIENT)
Dept: LAB | Facility: CLINIC | Age: 37
End: 2022-05-05
Payer: COMMERCIAL

## 2022-05-05 DIAGNOSIS — E03.9 HYPOTHYROIDISM, UNSPECIFIED TYPE: ICD-10-CM

## 2022-05-05 LAB
T4 FREE SERPL-MCNC: 1.04 NG/DL (ref 0.7–1.8)
TSH SERPL DL<=0.005 MIU/L-ACNC: 0.6 UIU/ML (ref 0.3–5)

## 2022-05-05 PROCEDURE — 84443 ASSAY THYROID STIM HORMONE: CPT

## 2022-05-05 PROCEDURE — 36415 COLL VENOUS BLD VENIPUNCTURE: CPT

## 2022-05-05 PROCEDURE — 84439 ASSAY OF FREE THYROXINE: CPT

## 2022-05-06 ENCOUNTER — TRANSFERRED RECORDS (OUTPATIENT)
Dept: HEALTH INFORMATION MANAGEMENT | Facility: CLINIC | Age: 37
End: 2022-05-06
Payer: COMMERCIAL

## 2022-05-19 ENCOUNTER — TRANSFERRED RECORDS (OUTPATIENT)
Dept: HEALTH INFORMATION MANAGEMENT | Facility: CLINIC | Age: 37
End: 2022-05-19
Payer: COMMERCIAL

## 2022-09-24 ENCOUNTER — HEALTH MAINTENANCE LETTER (OUTPATIENT)
Age: 37
End: 2022-09-24

## 2022-09-26 ENCOUNTER — HOSPITAL ENCOUNTER (OUTPATIENT)
Dept: MAMMOGRAPHY | Facility: CLINIC | Age: 37
Discharge: HOME | End: 2022-09-26
Attending: NURSE PRACTITIONER
Payer: COMMERCIAL

## 2022-09-26 DIAGNOSIS — N63.0 BREAST LUMP: ICD-10-CM

## 2022-09-26 PROCEDURE — 76642 ULTRASOUND BREAST LIMITED: CPT | Mod: LT

## 2022-09-26 PROCEDURE — 77066 DX MAMMO INCL CAD BI: CPT

## 2022-11-11 ENCOUNTER — LAB REQUISITION (OUTPATIENT)
Dept: LAB | Facility: CLINIC | Age: 37
End: 2022-11-11
Payer: COMMERCIAL

## 2022-11-11 DIAGNOSIS — Z11.3 ENCOUNTER FOR SCREENING FOR INFECTIONS WITH A PREDOMINANTLY SEXUAL MODE OF TRANSMISSION: ICD-10-CM

## 2022-11-11 PROCEDURE — 87491 CHLMYD TRACH DNA AMP PROBE: CPT | Mod: ORL | Performed by: NURSE PRACTITIONER

## 2022-11-11 PROCEDURE — 87591 N.GONORRHOEAE DNA AMP PROB: CPT | Mod: ORL | Performed by: NURSE PRACTITIONER

## 2022-11-12 LAB
C TRACH DNA SPEC QL PROBE+SIG AMP: NEGATIVE
N GONORRHOEA DNA SPEC QL NAA+PROBE: NEGATIVE

## 2022-11-22 ENCOUNTER — TELEPHONE (OUTPATIENT)
Dept: FAMILY MEDICINE | Facility: CLINIC | Age: 37
End: 2022-11-22

## 2022-11-22 DIAGNOSIS — Z82.49 FAMILY HISTORY OF HYPERTROPHIC CARDIOMYOPATHY: Primary | ICD-10-CM

## 2022-11-22 NOTE — TELEPHONE ENCOUNTER
Reason for Call:  Other Referral possibly.     Detailed comments: Pt is calling not sure exactly what she is needing but her uncle recently passed away from hypertrophic cardiomyopathy HCM. Patient states that it is hereditary and would like to either do a screening if there I sone available or possible referral to see a cardiologist .  Please advise and contact patient to inform      Phone Number Patient can be reached at: Cell number on file:    Telephone Information:   Mobile [phone]       Best Time: anytime    Can we leave a detailed message on this number? YES    Call taken on 11/22/2022 at 11:47 AM by hSerron Espitia

## 2022-11-23 NOTE — TELEPHONE ENCOUNTER
With family history of hypertrophic cardiomyopathy, I typically perform an echocardiogram, should only need to be done once since she is an adult.  I will place order.  Our cardiology office will contact her to schedule.  ZEB

## 2022-12-02 ENCOUNTER — HOSPITAL ENCOUNTER (OUTPATIENT)
Dept: CARDIOLOGY | Facility: CLINIC | Age: 37
Discharge: HOME | End: 2022-12-02
Attending: FAMILY MEDICINE | Admitting: FAMILY MEDICINE
Payer: COMMERCIAL

## 2022-12-02 DIAGNOSIS — Z82.49 FAMILY HISTORY OF HYPERTROPHIC CARDIOMYOPATHY: ICD-10-CM

## 2022-12-02 PROCEDURE — 93306 TTE W/DOPPLER COMPLETE: CPT | Mod: 26 | Performed by: INTERNAL MEDICINE

## 2022-12-02 PROCEDURE — 255N000002 HC RX 255 OP 636: Performed by: FAMILY MEDICINE

## 2022-12-02 PROCEDURE — 999N000208 ECHOCARDIOGRAM COMPLETE

## 2022-12-02 RX ADMIN — PERFLUTREN 3 ML: 6.52 INJECTION, SUSPENSION INTRAVENOUS at 15:20

## 2022-12-08 ENCOUNTER — APPOINTMENT (OUTPATIENT)
Dept: URBAN - METROPOLITAN AREA CLINIC 260 | Age: 37
Setting detail: DERMATOLOGY
End: 2022-12-09

## 2022-12-08 VITALS — HEIGHT: 66 IN | WEIGHT: 202 LBS

## 2022-12-08 DIAGNOSIS — Z71.89 OTHER SPECIFIED COUNSELING: ICD-10-CM

## 2022-12-08 DIAGNOSIS — L81.4 OTHER MELANIN HYPERPIGMENTATION: ICD-10-CM

## 2022-12-08 DIAGNOSIS — L71.8 OTHER ROSACEA: ICD-10-CM

## 2022-12-08 DIAGNOSIS — L82.1 OTHER SEBORRHEIC KERATOSIS: ICD-10-CM

## 2022-12-08 DIAGNOSIS — D22 MELANOCYTIC NEVI: ICD-10-CM

## 2022-12-08 DIAGNOSIS — I99.8 OTHER DISORDER OF CIRCULATORY SYSTEM: ICD-10-CM

## 2022-12-08 DIAGNOSIS — D18.0 HEMANGIOMA: ICD-10-CM

## 2022-12-08 PROBLEM — D22.39 MELANOCYTIC NEVI OF OTHER PARTS OF FACE: Status: ACTIVE | Noted: 2022-12-08

## 2022-12-08 PROBLEM — D18.01 HEMANGIOMA OF SKIN AND SUBCUTANEOUS TISSUE: Status: ACTIVE | Noted: 2022-12-08

## 2022-12-08 PROBLEM — D22.5 MELANOCYTIC NEVI OF TRUNK: Status: ACTIVE | Noted: 2022-12-08

## 2022-12-08 PROCEDURE — OTHER COUNSELING: OTHER

## 2022-12-08 PROCEDURE — OTHER ADDITIONAL NOTES: OTHER

## 2022-12-08 PROCEDURE — OTHER MIPS QUALITY: OTHER

## 2022-12-08 PROCEDURE — OTHER PRESCRIPTION SAMPLES PROVIDED: OTHER

## 2022-12-08 PROCEDURE — 99213 OFFICE O/P EST LOW 20 MIN: CPT

## 2022-12-08 ASSESSMENT — LOCATION DETAILED DESCRIPTION DERM
LOCATION DETAILED: INFERIOR THORACIC SPINE
LOCATION DETAILED: MIDDLE STERNUM
LOCATION DETAILED: LEFT LATERAL PROXIMAL PRETIBIAL REGION
LOCATION DETAILED: EPIGASTRIC SKIN
LOCATION DETAILED: LEFT CENTRAL MALAR CHEEK

## 2022-12-08 ASSESSMENT — LOCATION SIMPLE DESCRIPTION DERM
LOCATION SIMPLE: ABDOMEN
LOCATION SIMPLE: LEFT CHEEK
LOCATION SIMPLE: CHEST
LOCATION SIMPLE: LEFT PRETIBIAL REGION
LOCATION SIMPLE: UPPER BACK

## 2022-12-08 ASSESSMENT — LOCATION ZONE DERM
LOCATION ZONE: LEG
LOCATION ZONE: FACE
LOCATION ZONE: TRUNK

## 2022-12-08 NOTE — PROCEDURE: ADDITIONAL NOTES
Additional Notes: Patient will return to discuss about treatment for acne and Rosacea.
Render Risk Assessment In Note?: no
Detail Level: Detailed

## 2022-12-08 NOTE — PROCEDURE: COUNSELING
Detail Level: Detailed
Sunscreen Recommendations: Benign
Detail Level: Generalized
Detail Level: Simple
Detail Level: Zone

## 2023-01-29 ENCOUNTER — HEALTH MAINTENANCE LETTER (OUTPATIENT)
Age: 38
End: 2023-01-29

## 2023-03-17 ENCOUNTER — LAB (OUTPATIENT)
Dept: LAB | Facility: CLINIC | Age: 38
End: 2023-03-17
Payer: COMMERCIAL

## 2023-03-17 DIAGNOSIS — E03.9 HYPOTHYROIDISM, UNSPECIFIED TYPE: ICD-10-CM

## 2023-03-17 LAB
CREAT SERPL-MCNC: 0.69 MG/DL (ref 0.51–0.95)
GFR SERPL CREATININE-BSD FRML MDRD: >90 ML/MIN/1.73M2
T4 FREE SERPL-MCNC: 1.44 NG/DL (ref 0.9–1.7)
TSH SERPL DL<=0.005 MIU/L-ACNC: 1.7 UIU/ML (ref 0.3–4.2)

## 2023-03-17 PROCEDURE — 82306 VITAMIN D 25 HYDROXY: CPT

## 2023-03-17 PROCEDURE — 82565 ASSAY OF CREATININE: CPT

## 2023-03-17 PROCEDURE — 36415 COLL VENOUS BLD VENIPUNCTURE: CPT

## 2023-03-17 PROCEDURE — 84439 ASSAY OF FREE THYROXINE: CPT

## 2023-03-17 PROCEDURE — 84443 ASSAY THYROID STIM HORMONE: CPT

## 2023-03-20 LAB — DEPRECATED CALCIDIOL+CALCIFEROL SERPL-MC: 16 UG/L (ref 20–75)

## 2023-03-28 ENCOUNTER — VIRTUAL VISIT (OUTPATIENT)
Dept: ENDOCRINOLOGY | Facility: CLINIC | Age: 38
End: 2023-03-28
Payer: COMMERCIAL

## 2023-03-28 DIAGNOSIS — E55.9 VITAMIN D DEFICIENCY: ICD-10-CM

## 2023-03-28 DIAGNOSIS — E03.9 HYPOTHYROIDISM, UNSPECIFIED TYPE: ICD-10-CM

## 2023-03-28 PROCEDURE — 99214 OFFICE O/P EST MOD 30 MIN: CPT | Mod: VID | Performed by: NURSE PRACTITIONER

## 2023-03-28 RX ORDER — LEVOTHYROXINE SODIUM 125 UG/1
TABLET ORAL
Qty: 110 TABLET | Refills: 3 | Status: SHIPPED | OUTPATIENT
Start: 2023-03-28 | End: 2024-03-28

## 2023-03-28 NOTE — PROGRESS NOTES
"[facility] ENDOCRINOLOGY    Thyroid Note  3/28/2023    Eli Pineda, 1985, [id number]          Reason for visit      1. Hypothyroidism, unspecified type    2. Vitamin D deficiency        HPI     Eli Pineda is a very pleasant 38 year old old female who presents for follow up.  SUMMARY:    Eli is contacted today via Video Visit in f/u for Hypothyroidism. She reports that she is feeling well. She remains on Levothyroxine, 125 mcg daily, 6 days a week and 250 on the 7th. Current TSH is 1.70 and fT4 is 1.44. She is having no problems referable to her neck.     Current Vit D level is 16 and low. Pt states that she \"has some gummies\" but doesn't always remember to take them. She is going to work on this.     Past Medical History     Patient Active Problem List   Diagnosis     Colitis     Elevated C-reactive Protein     Headache     Hypothyroidism     Anxiety     Attention deficit hyperactivity disorder (ADHD), combined type     Mild episode of recurrent major depressive disorder (H)     Asthma     Gastroesophageal reflux disease     Mixed stress and urge urinary incontinence     Uterovaginal prolapse     Obesity (BMI 35.0-39.9) with comorbidity (H)     Diarrhea     Noninfectious gastroenteritis     Nonspecific abdominal pain     Vitamin D deficiency       Family History       family history includes Thyroid Disease in her mother.    Social History      reports that she has never smoked. She has never used smokeless tobacco. She reports current alcohol use of about 4.0 standard drinks per week. She reports that she does not use drugs.      Review of Systems     Patient denies fatigue, weight changes, heat/cold intolerance, bowel/skin changes or CVS symptoms.   Remainder per HPI and per attached intake form.      Vital Signs     There were no vitals taken for this visit.  Wt Readings from Last 3 Encounters:   07/09/21 100.7 kg (222 lb)   01/29/21 98.5 kg (217 lb 4 oz)   03/06/20 92.7 kg (204 lb " 4.8 oz)       Physical Exam     Constitutional:  Well developed, Well nourished  HENT:  Normocephalic,   Neck: normal in appearance  Eyes:  PERRL, Conjunctiva pink  Respiratory:  No respiratory distress  Skin: No acanthosis nigricans, lipoatrophy or lipodystrophy  Neurologic:  Alert & oriented x 3, nonfocal  Psychiatric:  Affect, Mood, Insight appropriate          Assessment     1. Hypothyroidism, unspecified type    2. Vitamin D deficiency            Plan     Pt is bio-chemically and physically euthyroid. She will remain on her current dosage and f/u with me in 1 year.     As stated, she is going to work on taking her Gummies regularly.         Cuca Duff NP  [facility]  3/28/2023  10:31 AM        Lab Results     TSH   Date Value Ref Range Status   03/17/2023 1.70 0.30 - 4.20 uIU/mL Final   05/05/2022 0.60 0.30 - 5.00 uIU/mL Final     No components found for: THYROIDAB    No results found for: P2HIEJZ    Imaging Results   Last thyroid ultrasound:  No results found for this or any previous visit.      Last thyroid nuclear scan:  No results found for this or any previous visit.      Current Medications     Outpatient Medications Prior to Visit   Medication Sig Dispense Refill     clindamycin (CLEOCIN T) 1 % external lotion clindamycin 1 % lotion       LEVONORGESTREL (MIRENA UTRN) [LEVONORGESTREL (MIRENA UTRN)] by Intrauterine route.       levothyroxine (SYNTHROID/LEVOTHROID) 125 MCG tablet [LEVOTHYROXINE (SYNTHROID, LEVOTHROID) 125 MCG TABLET] TAKE 1 TABLET BY MOUTH EVERY DAY 90 tablet 3     No facility-administered medications prior to visit.           Virtual Visit Details    Type of service:  Video Visit     Originating Location (pt. Location): Home    Distant Location (provider location):  On-site  Platform used for Video Visit: Mohinder

## 2023-03-28 NOTE — LETTER
"    3/28/2023         RE: Eli Pineda  [address]  [place] [address]        Dear Colleague,    Thank you for referring your patient, Eli Pineda, to [facility]. Please see a copy of my visit note below.    [facility] ENDOCRINOLOGY    Thyroid Note  3/28/2023    Eli Pineda, 1985, [id number]          Reason for visit      1. Hypothyroidism, unspecified type    2. Vitamin D deficiency        HPI     Eli Pineda is a very pleasant 38 year old old female who presents for follow up.  SUMMARY:    Eil is contacted today via Video Visit in f/u for Hypothyroidism. She reports that she is feeling well. She remains on Levothyroxine, 125 mcg daily, 6 days a week and 250 on the 7th. Current TSH is 1.70 and fT4 is 1.44. She is having no problems referable to her neck.     Current Vit D level is 16 and low. Pt states that she \"has some gummies\" but doesn't always remember to take them. She is going to work on this.     Past Medical History     Patient Active Problem List   Diagnosis     Colitis     Elevated C-reactive Protein     Headache     Hypothyroidism     Anxiety     Attention deficit hyperactivity disorder (ADHD), combined type     Mild episode of recurrent major depressive disorder (H)     Asthma     Gastroesophageal reflux disease     Mixed stress and urge urinary incontinence     Uterovaginal prolapse     Obesity (BMI 35.0-39.9) with comorbidity (H)     Diarrhea     Noninfectious gastroenteritis     Nonspecific abdominal pain     Vitamin D deficiency       Family History       family history includes Thyroid Disease in her mother.    Social History      reports that she has never smoked. She has never used smokeless tobacco. She reports current alcohol use of about 4.0 standard drinks per week. She reports that she does not use drugs.      Review of Systems     Patient denies fatigue, weight changes, heat/cold intolerance, bowel/skin changes or CVS " symptoms.   Remainder per HPI and per attached intake form.      Vital Signs     There were no vitals taken for this visit.  Wt Readings from Last 3 Encounters:   07/09/21 100.7 kg (222 lb)   01/29/21 98.5 kg (217 lb 4 oz)   03/06/20 92.7 kg (204 lb 4.8 oz)       Physical Exam     Constitutional:  Well developed, Well nourished  HENT:  Normocephalic,   Neck: normal in appearance  Eyes:  PERRL, Conjunctiva pink  Respiratory:  No respiratory distress  Skin: No acanthosis nigricans, lipoatrophy or lipodystrophy  Neurologic:  Alert & oriented x 3, nonfocal  Psychiatric:  Affect, Mood, Insight appropriate          Assessment     1. Hypothyroidism, unspecified type    2. Vitamin D deficiency            Plan     Pt is bio-chemically and physically euthyroid. She will remain on her current dosage and f/u with me in 1 year.     As stated, she is going to work on taking her Gummies regularly.         Cuca Duff NP   Endocrinology  3/28/2023  10:31 AM        Lab Results     TSH   Date Value Ref Range Status   03/17/2023 1.70 0.30 - 4.20 uIU/mL Final   05/05/2022 0.60 0.30 - 5.00 uIU/mL Final     No components found for: THYROIDAB    No results found for: [id number]    Imaging Results   Last thyroid ultrasound:  No results found for this or any previous visit.      Last thyroid nuclear scan:  No results found for this or any previous visit.      Current Medications     Outpatient Medications Prior to Visit   Medication Sig Dispense Refill     clindamycin (CLEOCIN T) 1 % external lotion clindamycin 1 % lotion       LEVONORGESTREL (MIRENA UTRN) [LEVONORGESTREL (MIRENA UTRN)] by Intrauterine route.       levothyroxine (SYNTHROID/LEVOTHROID) 125 MCG tablet [LEVOTHYROXINE (SYNTHROID, LEVOTHROID) 125 MCG TABLET] TAKE 1 TABLET BY MOUTH EVERY DAY 90 tablet 3     No facility-administered medications prior to visit.           Virtual Visit Details    Type of service:  Video Visit     Originating Location (pt. Location):  Home    Distant Location (provider location):  On-site  Platform used for Video Visit: Mohinder              Again, thank you for allowing me to participate in the care of your patient.        Sincerely,        Cuca Duff NP

## 2023-06-02 DIAGNOSIS — E03.9 HYPOTHYROIDISM, UNSPECIFIED TYPE: ICD-10-CM

## 2023-06-02 RX ORDER — LEVOTHYROXINE SODIUM 125 UG/1
TABLET ORAL
Qty: 90 TABLET | Refills: 3 | OUTPATIENT
Start: 2023-06-02

## 2023-06-02 NOTE — TELEPHONE ENCOUNTER
"Requested Prescriptions   Pending Prescriptions Disp Refills     levothyroxine (SYNTHROID/LEVOTHROID) 125 MCG tablet [Pharmacy Med Name: LEVOTHYROXINE 125 MCG TABLET] 90 tablet 3     Sig: TAKE 1 TABLET BY MOUTH EVERY DAY       Thyroid Protocol Passed - 6/2/2023  2:57 PM        Passed - Patient is 12 years or older        Passed - Recent (12 mo) or future (30 days) visit within the authorizing provider's specialty     Patient has had an office visit with the authorizing provider or a provider within the authorizing providers department within the previous 12 mos or has a future within next 30 days. See \"Patient Info\" tab in inbasket, or \"Choose Columns\" in Meds & Orders section of the refill encounter.              Passed - Medication is active on med list        Passed - Normal TSH on file in past 12 months     Recent Labs   Lab Test 03/17/23  0715   TSH 1.70              Passed - No active pregnancy on record     If patient is pregnant or has had a positive pregnancy test, please check TSH.          Passed - No positive pregnancy test in past 12 months     If patient is pregnant or has had a positive pregnancy test, please check TSH.               "

## 2023-07-05 ENCOUNTER — TRANSFERRED RECORDS (OUTPATIENT)
Dept: HEALTH INFORMATION MANAGEMENT | Facility: CLINIC | Age: 38
End: 2023-07-05
Payer: COMMERCIAL

## 2023-07-13 ENCOUNTER — TRANSFERRED RECORDS (OUTPATIENT)
Dept: HEALTH INFORMATION MANAGEMENT | Facility: CLINIC | Age: 38
End: 2023-07-13
Payer: COMMERCIAL

## 2023-10-09 ENCOUNTER — TELEPHONE (OUTPATIENT)
Dept: ENDOCRINOLOGY | Facility: CLINIC | Age: 38
End: 2023-10-09
Payer: COMMERCIAL

## 2023-10-09 NOTE — TELEPHONE ENCOUNTER
I called the pt and advise that she call the pharmacy to process a refill, tell them what happened, and make sure they know to perform an over-ride so insurance will cover. Pt understands.    Reviewed self and infant care w / mom, she verbalizes understanding of instructions reviewed. Encourage to follow up w/ MDs as directed and w/ questions/concerns. Lives w FOB for help. EPDS = 8, denies ppd or bb but care reviewed and enc to go to ct.  Appt

## 2023-10-09 NOTE — TELEPHONE ENCOUNTER
[facility] Call Center    Phone Message    May a detailed message be left on voicemail: yes     Reason for Call: Medication Refill Request    Has the patient contacted the pharmacy for the refill? Yes   Name of medication being requested:   levothyroxine (SYNTHROID/LEVOTHROID) 125 MCG tablet     Provider who prescribed the medication: Cuca Duff  Pharmacy: [facility] [address] [place] [address] [address]   Date medication is needed: as soon as possible. Per patient her dog ate her entire bottle. Patient did call her vet and they state the dog will be fine. Patient has no remaining medication. Please review       Action Taken: Message routed to:  [facility] endo    Travel Screening: Not Applicable

## 2023-10-13 ENCOUNTER — PATIENT OUTREACH (OUTPATIENT)
Dept: CARE COORDINATION | Facility: CLINIC | Age: 38
End: 2023-10-13
Payer: COMMERCIAL

## 2023-10-27 ENCOUNTER — PATIENT OUTREACH (OUTPATIENT)
Dept: CARE COORDINATION | Facility: CLINIC | Age: 38
End: 2023-10-27
Payer: COMMERCIAL

## 2023-12-17 ENCOUNTER — HEALTH MAINTENANCE LETTER (OUTPATIENT)
Age: 38
End: 2023-12-17

## 2024-02-26 DIAGNOSIS — E03.9 HYPOTHYROIDISM, UNSPECIFIED TYPE: Primary | ICD-10-CM

## 2024-02-29 ENCOUNTER — LAB (OUTPATIENT)
Dept: LAB | Facility: CLINIC | Age: 39
End: 2024-02-29
Payer: COMMERCIAL

## 2024-02-29 DIAGNOSIS — E03.9 HYPOTHYROIDISM, UNSPECIFIED TYPE: ICD-10-CM

## 2024-02-29 PROCEDURE — 84439 ASSAY OF FREE THYROXINE: CPT

## 2024-02-29 PROCEDURE — 36415 COLL VENOUS BLD VENIPUNCTURE: CPT

## 2024-02-29 PROCEDURE — 84443 ASSAY THYROID STIM HORMONE: CPT

## 2024-03-01 LAB
T4 FREE SERPL-MCNC: 2.17 NG/DL (ref 0.9–1.7)
TSH SERPL DL<=0.005 MIU/L-ACNC: 0.02 UIU/ML (ref 0.3–4.2)

## 2024-03-18 ENCOUNTER — OFFICE VISIT (OUTPATIENT)
Dept: FAMILY MEDICINE | Facility: CLINIC | Age: 39
End: 2024-03-18
Payer: COMMERCIAL

## 2024-03-18 VITALS
DIASTOLIC BLOOD PRESSURE: 72 MMHG | BODY MASS INDEX: 30.7 KG/M2 | SYSTOLIC BLOOD PRESSURE: 110 MMHG | TEMPERATURE: 98.6 F | HEIGHT: 67 IN | WEIGHT: 195.6 LBS | HEART RATE: 75 BPM | OXYGEN SATURATION: 100 % | RESPIRATION RATE: 18 BRPM

## 2024-03-18 DIAGNOSIS — R25.2 FOOT CRAMPS: ICD-10-CM

## 2024-03-18 DIAGNOSIS — R20.2 PARESTHESIAS: ICD-10-CM

## 2024-03-18 DIAGNOSIS — E03.9 HYPOTHYROIDISM, UNSPECIFIED TYPE: ICD-10-CM

## 2024-03-18 LAB
ERYTHROCYTE [DISTWIDTH] IN BLOOD BY AUTOMATED COUNT: 13.1 % (ref 10–15)
HBA1C MFR BLD: 4.8 % (ref 0–5.6)
HCT VFR BLD AUTO: 40.6 % (ref 35–47)
HGB BLD-MCNC: 13.6 G/DL (ref 11.7–15.7)
MCH RBC QN AUTO: 30.7 PG (ref 26.5–33)
MCHC RBC AUTO-ENTMCNC: 33.5 G/DL (ref 31.5–36.5)
MCV RBC AUTO: 92 FL (ref 78–100)
PLATELET # BLD AUTO: 251 10E3/UL (ref 150–450)
RBC # BLD AUTO: 4.43 10E6/UL (ref 3.8–5.2)
WBC # BLD AUTO: 7.5 10E3/UL (ref 4–11)

## 2024-03-18 PROCEDURE — 83036 HEMOGLOBIN GLYCOSYLATED A1C: CPT | Performed by: FAMILY MEDICINE

## 2024-03-18 PROCEDURE — 85027 COMPLETE CBC AUTOMATED: CPT | Performed by: FAMILY MEDICINE

## 2024-03-18 PROCEDURE — 84443 ASSAY THYROID STIM HORMONE: CPT | Performed by: FAMILY MEDICINE

## 2024-03-18 PROCEDURE — 82607 VITAMIN B-12: CPT | Performed by: FAMILY MEDICINE

## 2024-03-18 PROCEDURE — 36415 COLL VENOUS BLD VENIPUNCTURE: CPT | Performed by: FAMILY MEDICINE

## 2024-03-18 PROCEDURE — 99214 OFFICE O/P EST MOD 30 MIN: CPT | Performed by: FAMILY MEDICINE

## 2024-03-18 PROCEDURE — 84439 ASSAY OF FREE THYROXINE: CPT | Performed by: FAMILY MEDICINE

## 2024-03-18 PROCEDURE — 83735 ASSAY OF MAGNESIUM: CPT | Performed by: FAMILY MEDICINE

## 2024-03-18 PROCEDURE — 82728 ASSAY OF FERRITIN: CPT | Performed by: FAMILY MEDICINE

## 2024-03-18 PROCEDURE — 82306 VITAMIN D 25 HYDROXY: CPT | Performed by: FAMILY MEDICINE

## 2024-03-18 PROCEDURE — 80053 COMPREHEN METABOLIC PANEL: CPT | Performed by: FAMILY MEDICINE

## 2024-03-18 RX ORDER — TIRZEPATIDE 7.5 MG/.5ML
7.5 INJECTION, SOLUTION SUBCUTANEOUS
COMMUNITY
Start: 2024-02-16

## 2024-03-18 ASSESSMENT — PATIENT HEALTH QUESTIONNAIRE - PHQ9
SUM OF ALL RESPONSES TO PHQ QUESTIONS 1-9: 2
SUM OF ALL RESPONSES TO PHQ QUESTIONS 1-9: 2
10. IF YOU CHECKED OFF ANY PROBLEMS, HOW DIFFICULT HAVE THESE PROBLEMS MADE IT FOR YOU TO DO YOUR WORK, TAKE CARE OF THINGS AT HOME, OR GET ALONG WITH OTHER PEOPLE: NOT DIFFICULT AT ALL

## 2024-03-18 ASSESSMENT — ASTHMA QUESTIONNAIRES
ACT_TOTALSCORE: 25
QUESTION_2 LAST FOUR WEEKS HOW OFTEN HAVE YOU HAD SHORTNESS OF BREATH: NOT AT ALL
QUESTION_3 LAST FOUR WEEKS HOW OFTEN DID YOUR ASTHMA SYMPTOMS (WHEEZING, COUGHING, SHORTNESS OF BREATH, CHEST TIGHTNESS OR PAIN) WAKE YOU UP AT NIGHT OR EARLIER THAN USUAL IN THE MORNING: NOT AT ALL
ACT_TOTALSCORE: 25
QUESTION_5 LAST FOUR WEEKS HOW WOULD YOU RATE YOUR ASTHMA CONTROL: COMPLETELY CONTROLLED
QUESTION_1 LAST FOUR WEEKS HOW MUCH OF THE TIME DID YOUR ASTHMA KEEP YOU FROM GETTING AS MUCH DONE AT WORK, SCHOOL OR AT HOME: NONE OF THE TIME
QUESTION_4 LAST FOUR WEEKS HOW OFTEN HAVE YOU USED YOUR RESCUE INHALER OR NEBULIZER MEDICATION (SUCH AS ALBUTEROL): NOT AT ALL

## 2024-03-18 ASSESSMENT — PAIN SCALES - GENERAL: PAINLEVEL: NO PAIN (0)

## 2024-03-18 NOTE — PROGRESS NOTES
"  Assessment & Plan     Paresthesias  Foot cramps  Normal examination today.  Symptoms nonspecific.  Neurologic exam without significant findings.  Will check TSH, CBC, comprehensive metabolic panel, will check for iron deficiency with ferritin, check for vitamin B12 deficiency, magnesium deficiency, diabetes.  If unremarkable, would initiate magnesium supplement.  Advise adequate hydration, regular physical activity.  - TSH with free T4 reflex; Future  - Magnesium; Future  - Comprehensive metabolic panel; Future  - CBC with platelets; Future  - Ferritin; Future  - Vitamin B12; Future  - Hemoglobin A1c; Future            BMI  Estimated body mass index is 31.1 kg/m  as calculated from the following:    Height as of this encounter: 1.689 m (5' 6.5\").    Weight as of this encounter: 88.7 kg (195 lb 9.6 oz).             Mine Hoang is a 39 year old, presenting for the following health issues:  Nerve issues? (Past month feels like water is running down back, burning sensation in hands, a year ago hand tingling sensation in nose, that is better)    Via the Health Maintenance questionnaire, the patient has reported the following services have been completed -Cervical Cancer Screening, this information has been sent to the abstraction team.  Here for evaluation of a conglomeration of symptoms.  About a year ago she had onset of a sensation of tingling and tingling like there was here on her nose off and on for about a month.  That resolved and did not return.  She describes episodes of shooting pain in her leg, that was brief and resolved.  Around the same time she developed intense muscle spasms of both feet, often occurring at the same time, causing all toes to curl.  Needing to use her hand or needing to bear weight in order to get it to stop, often lasting about 3 minutes.  Always onset at rest either laying in bed or sitting on the floor.  That is continued off-and-on.  For the past months she has had a sensation " "as though there is water trickling or someone stroking her left mid back.  Sometimes will feel dizzy.  Has had a sense of burning on her hands intermittently, dorsal surface, as though there is very hot water on it, will be very sensitive to touch as the burns.  No change in color or appearance.  Feeling a bit more clumsy, tending to stumble more to the right but without any falls or injuries.  No swallowing changes.  No vision changes other than isolated floaters.  She started Zepp bound about 3 months ago, has otherwise tolerated this well.    History of Present Illness       Reason for visit:  Grover nerve senstion and muscle spasms  Symptom onset:  3-4 weeks ago  Symptoms include:  Tingling on one side of my back.feet contourting and spasms.dizzyness  Symptom intensity:  Moderate  Symptom progression:  Staying the same  Had these symptoms before:  Yes  Has tried/received treatment for these symptoms:  No  What makes it worse:  No  What makes it better:  No    She eats 2-3 servings of fruits and vegetables daily.She consumes 0 sweetened beverage(s) daily.She exercises with enough effort to increase her heart rate 20 to 29 minutes per day.  She exercises with enough effort to increase her heart rate 5 days per week.   She is taking medications regularly.                     Objective    /72   Pulse 75   Temp 98.6  F (37  C) (Temporal)   Resp 18   Ht 1.689 m (5' 6.5\")   Wt 88.7 kg (195 lb 9.6 oz)   LMP  (LMP Unknown)   SpO2 100%   BMI 31.10 kg/m    Body mass index is 31.1 kg/m .  Physical Exam   Alert very pleasant.  Pupils are equal, round, reactive to light.  Tympanic membranes pearly and translucent.  Oropharynx is clear.  Face moves symmetrically.  Neck supple without lymphadenopathy or thyromegaly.  Normal shoulder shrug.  Heart with regular rate and rhythm.  Lungs clear.  Extremities without edema.  Good pulses.  5 5 strength in all 4 extremities.  Finger-nose-finger testing intact.  Deep tendon " reflexes 2+ and symmetric bilaterally.  No clonus.  Tandem gait intact.  Romberg negative.            Signed Electronically by: Teri Montalvo MD

## 2024-03-19 LAB
ALBUMIN SERPL BCG-MCNC: 4.3 G/DL (ref 3.5–5.2)
ALP SERPL-CCNC: 60 U/L (ref 40–150)
ALT SERPL W P-5'-P-CCNC: 16 U/L (ref 0–50)
ANION GAP SERPL CALCULATED.3IONS-SCNC: 11 MMOL/L (ref 7–15)
AST SERPL W P-5'-P-CCNC: 18 U/L (ref 0–45)
BILIRUB SERPL-MCNC: 0.2 MG/DL
BUN SERPL-MCNC: 9.9 MG/DL (ref 6–20)
CALCIUM SERPL-MCNC: 9.6 MG/DL (ref 8.6–10)
CHLORIDE SERPL-SCNC: 106 MMOL/L (ref 98–107)
CREAT SERPL-MCNC: 0.66 MG/DL (ref 0.51–0.95)
DEPRECATED HCO3 PLAS-SCNC: 24 MMOL/L (ref 22–29)
EGFRCR SERPLBLD CKD-EPI 2021: >90 ML/MIN/1.73M2
FERRITIN SERPL-MCNC: 268 NG/ML (ref 6–175)
GLUCOSE SERPL-MCNC: 89 MG/DL (ref 70–99)
MAGNESIUM SERPL-MCNC: 2.1 MG/DL (ref 1.7–2.3)
POTASSIUM SERPL-SCNC: 4.3 MMOL/L (ref 3.4–5.3)
PROT SERPL-MCNC: 6.8 G/DL (ref 6.4–8.3)
SODIUM SERPL-SCNC: 141 MMOL/L (ref 135–145)
T4 FREE SERPL-MCNC: 1.6 NG/DL (ref 0.9–1.7)
TSH SERPL DL<=0.005 MIU/L-ACNC: 0.06 UIU/ML (ref 0.3–4.2)
VIT B12 SERPL-MCNC: 346 PG/ML (ref 232–1245)
VIT D+METAB SERPL-MCNC: 25 NG/ML (ref 20–50)

## 2024-03-28 DIAGNOSIS — E03.9 HYPOTHYROIDISM, UNSPECIFIED TYPE: ICD-10-CM

## 2024-03-28 RX ORDER — LEVOTHYROXINE SODIUM 125 UG/1
125 TABLET ORAL DAILY
Qty: 30 TABLET | Refills: 0 | Status: SHIPPED | OUTPATIENT
Start: 2024-03-28 | End: 2024-05-06

## 2024-03-28 NOTE — TELEPHONE ENCOUNTER
Requested Prescriptions   Pending Prescriptions Disp Refills    levothyroxine (SYNTHROID/LEVOTHROID) 125 MCG tablet [Pharmacy Med Name: LEVOTHYROXINE 125 MCG TABLET] 96 tablet 4     Sig: TAKE 1 TABLET BY MOUTH 6 DAYS A WEEK AND 2 ON THE 7TH       Thyroid Protocol Failed - 3/28/2024 12:56 AM        Failed - Recent (12 mo) or future (30 days) visit within the authorizing provider's specialty     The patient must have completed an in-person or virtual visit within the past 12 months or has a future visit scheduled within the next 90 days with the authorizing provider s specialty.  Urgent care and e-visits do not quality as an office visit for this protocol.          Failed - Normal TSH on file in past 12 months     Recent Labs   Lab Test 03/18/24  1647   TSH 0.06*              Passed - Patient is 12 years or older        Passed - Medication is active on med list        Passed - No active pregnancy on record     If patient is pregnant or has had a positive pregnancy test, please check TSH.          Passed - No positive pregnancy test in past 12 months     If patient is pregnant or has had a positive pregnancy test, please check TSH.

## 2024-04-05 ENCOUNTER — VIRTUAL VISIT (OUTPATIENT)
Dept: ENDOCRINOLOGY | Facility: CLINIC | Age: 39
End: 2024-04-05
Payer: COMMERCIAL

## 2024-04-05 DIAGNOSIS — E03.9 HYPOTHYROIDISM, UNSPECIFIED TYPE: Primary | ICD-10-CM

## 2024-04-05 PROCEDURE — 99214 OFFICE O/P EST MOD 30 MIN: CPT | Mod: 95 | Performed by: NURSE PRACTITIONER

## 2024-04-05 NOTE — LETTER
4/5/2024         RE: Eli Pineda  [address]  [facility] [address]        Dear Colleague,    Thank you for referring your patient, Eli Pineda, to the [facility] [place]. Please see a copy of my visit note below.      [facility] ENDOCRINOLOGY    Thyroid Note  4/5/2024    Eli Pineda, 1985, [id number]          Reason for visit      1. Hypothyroidism, unspecified type        HPI     Eli Pineda is a very pleasant 39 year old old female who presents for follow up.  SUMMARY:    Natalya is seen today via Video Visit in follow-up for Hypothyroidism. We made an adjustment to her Levothyroxine dosing about a month ago based upon her lab values and that she was experiencing some changes in her Menses.     TSH is now slightly higher (was 0.02 and now 0.06) and fT4 is lower (was   2.17 and now 1.60). She is taking Levothyroxine, 125 mcg daily.     She is also on Zepbound currently, and she has been able to lose about 35 lbs. Because she is now smaller, we may need to lower her Levothyroxine dose.     She reports some recent parasthesias and joint pain. She did go and see her PCP regarding this, so it is on the radar. Some of these can be side effects of the Levothyroxine.      Past Medical History     Patient Active Problem List   Diagnosis     Colitis     Elevated C-reactive Protein     Headache     Hypothyroidism     Anxiety     Attention deficit hyperactivity disorder (ADHD), combined type     Mild episode of recurrent major depressive disorder (H24)     Asthma     Gastroesophageal reflux disease     Mixed stress and urge urinary incontinence     Uterovaginal prolapse     Obesity (BMI 35.0-39.9) with comorbidity (H)     Diarrhea     Noninfectious gastroenteritis     Nonspecific abdominal pain     Vitamin D deficiency       Family History       family history includes Thyroid Disease in her mother.    Social History      reports that she has never smoked. She  "has never used smokeless tobacco. She reports current alcohol use of about 4.0 standard drinks of alcohol per week. She reports that she does not use drugs.      Review of Systems     Patient denies fatigue, weight changes, heat/cold intolerance, bowel/skin changes or CVS symptoms.   Remainder per HPI and per attached intake form.      Vital Signs     LMP  (LMP Unknown)   Wt Readings from Last 3 Encounters:   03/18/24 88.7 kg (195 lb 9.6 oz)   07/09/21 100.7 kg (222 lb)   01/29/21 98.5 kg (217 lb 4 oz)       Physical Exam     Constitutional:  Well developed, Well nourished  HENT:  Normocephalic,   Neck: normal in appearance  Eyes:  PERRL, Conjunctiva pink  Respiratory:  No respiratory distress  Skin: No acanthosis nigricans, lipoatrophy or lipodystrophy  Neurologic:  Alert & oriented x 3, nonfocal  Psychiatric:  Affect, Mood, Insight appropriate        Assessment     1. Hypothyroidism, unspecified type            Plan     Will get another set of labs done, which will be in 2 weeks. This will be 2 months from when we changed her dose in February. Based upon this finding, we will either leave her dose as is, or make a change.     Follow-up with me in 1 year with labs.         Cuca Duff NP  [facility]  4/5/2024  8:41 AM    Lab Results     TSH   Date Value Ref Range Status   03/18/2024 0.06 (L) 0.30 - 4.20 uIU/mL Final   05/05/2022 0.60 0.30 - 5.00 uIU/mL Final     No components found for: \"THYROIDAB\"    No results found for: \"V7JJTYG\"    Imaging Results   Last thyroid ultrasound:  No results found for this or any previous visit.      Last thyroid nuclear scan:  No results found for this or any previous visit.      Current Medications     Outpatient Medications Prior to Visit   Medication Sig Dispense Refill     clindamycin (CLEOCIN T) 1 % external lotion clindamycin 1 % lotion       levothyroxine (SYNTHROID/LEVOTHROID) 125 MCG tablet Take 1 tablet (125 mcg) by mouth daily 30 tablet 0     ZEPBOUND 7.5 " MG/0.5ML prefilled pen Inject 7.5 mg Subcutaneous every 7 days       LEVONORGESTREL (MIRENA UTRN) [LEVONORGESTREL (MIRENA UTRN)] by Intrauterine route. (Patient not taking: Reported on 4/5/2024)       No facility-administered medications prior to visit.         Virtual Visit Details    Type of service:  Video Visit     Originating Location (pt. Location): Home    Distant Location (provider location):  on site  Platform used for Video Visit: AmWell      Again, thank you for allowing me to participate in the care of your patient.        Sincerely,        Cuca Duff NP

## 2024-04-05 NOTE — PROGRESS NOTES
[facility] ENDOCRINOLOGY    Thyroid Note  4/5/2024    Eli Pineda, 1985, [id number]          Reason for visit      1. Hypothyroidism, unspecified type        HPI     Eli Pineda is a very pleasant 39 year old old female who presents for follow up.  SUMMARY:    Natalya is seen today via Video Visit in follow-up for Hypothyroidism. We made an adjustment to her Levothyroxine dosing about a month ago based upon her lab values and that she was experiencing some changes in her Menses.     TSH is now slightly higher (was 0.02 and now 0.06) and fT4 is lower (was   2.17 and now 1.60). She is taking Levothyroxine, 125 mcg daily.     She is also on Zepbound currently, and she has been able to lose about 35 lbs. Because she is now smaller, we may need to lower her Levothyroxine dose.     She reports some recent parasthesias and joint pain. She did go and see her PCP regarding this, so it is on the radar. Some of these can be side effects of the Levothyroxine.      Past Medical History     Patient Active Problem List   Diagnosis    Colitis    Elevated C-reactive Protein    Headache    Hypothyroidism    Anxiety    Attention deficit hyperactivity disorder (ADHD), combined type    Mild episode of recurrent major depressive disorder (H24)    Asthma    Gastroesophageal reflux disease    Mixed stress and urge urinary incontinence    Uterovaginal prolapse    Obesity (BMI 35.0-39.9) with comorbidity (H)    Diarrhea    Noninfectious gastroenteritis    Nonspecific abdominal pain    Vitamin D deficiency       Family History       family history includes Thyroid Disease in her mother.    Social History      reports that she has never smoked. She has never used smokeless tobacco. She reports current alcohol use of about 4.0 standard drinks of alcohol per week. She reports that she does not use drugs.      Review of Systems     Patient denies fatigue, weight changes, heat/cold intolerance, bowel/skin changes or CVS  "symptoms.   Remainder per HPI and per attached intake form.      Vital Signs     LMP  (LMP Unknown)   Wt Readings from Last 3 Encounters:   03/18/24 88.7 kg (195 lb 9.6 oz)   07/09/21 100.7 kg (222 lb)   01/29/21 98.5 kg (217 lb 4 oz)       Physical Exam     Constitutional:  Well developed, Well nourished  HENT:  Normocephalic,   Neck: normal in appearance  Eyes:  PERRL, Conjunctiva pink  Respiratory:  No respiratory distress  Skin: No acanthosis nigricans, lipoatrophy or lipodystrophy  Neurologic:  Alert & oriented x 3, nonfocal  Psychiatric:  Affect, Mood, Insight appropriate        Assessment     1. Hypothyroidism, unspecified type            Plan     Will get another set of labs done, which will be in 2 weeks. This will be 2 months from when we changed her dose in February. Based upon this finding, we will either leave her dose as is, or make a change.     Follow-up with me in 1 year with labs.         Cuca Duff NP  [facility]  4/5/2024  8:41 AM    Lab Results     TSH   Date Value Ref Range Status   03/18/2024 0.06 (L) 0.30 - 4.20 uIU/mL Final   05/05/2022 0.60 0.30 - 5.00 uIU/mL Final     No components found for: \"THYROIDAB\"    No results found for: \"W3NSXCN\"    Imaging Results   Last thyroid ultrasound:  No results found for this or any previous visit.      Last thyroid nuclear scan:  No results found for this or any previous visit.      Current Medications     Outpatient Medications Prior to Visit   Medication Sig Dispense Refill    clindamycin (CLEOCIN T) 1 % external lotion clindamycin 1 % lotion      levothyroxine (SYNTHROID/LEVOTHROID) 125 MCG tablet Take 1 tablet (125 mcg) by mouth daily 30 tablet 0    ZEPBOUND 7.5 MG/0.5ML prefilled pen Inject 7.5 mg Subcutaneous every 7 days      LEVONORGESTREL (MIRENA UTRN) [LEVONORGESTREL (MIRENA UTRN)] by Intrauterine route. (Patient not taking: Reported on 4/5/2024)       No facility-administered medications prior to visit.         Virtual Visit " Details    Type of service:  Video Visit     Originating Location (pt. Location): Home    Distant Location (provider location):  on site  Platform used for Video Visit: Mohinder

## 2024-04-19 ENCOUNTER — TELEPHONE (OUTPATIENT)
Dept: ENDOCRINOLOGY | Facility: CLINIC | Age: 39
End: 2024-04-19
Payer: COMMERCIAL

## 2024-04-19 DIAGNOSIS — E03.9 HYPOTHYROIDISM, UNSPECIFIED TYPE: ICD-10-CM

## 2024-04-19 NOTE — TELEPHONE ENCOUNTER
Pt needs labs now.    LOVE 4/5/24  Will get another set of labs done, which will be in 2 weeks. This will be 2 months from when we changed her dose in February     Requested Prescriptions   Pending Prescriptions Disp Refills    levothyroxine (SYNTHROID/LEVOTHROID) 125 MCG tablet [Pharmacy Med Name: LEVOTHYROXINE 125 MCG TABLET] 90 tablet 1     Sig: TAKE 1 TABLET BY MOUTH EVERY DAY       Thyroid Protocol Failed - 4/19/2024  8:33 AM        Failed - Normal TSH on file in past 12 months     Recent Labs   Lab Test 03/18/24  1647   TSH 0.06*              Passed - Patient is 12 years or older        Passed - Recent (12 mo) or future (30 days) visit within the authorizing provider's specialty     The patient must have completed an in-person or virtual visit within the past 12 months or has a future visit scheduled within the next 90 days with the authorizing provider s specialty.  Urgent care and e-visits do not quality as an office visit for this protocol.          Passed - Medication is active on med list        Passed - Medication indicated for associated diagnosis     Medication is associated with one or more of the following diagnoses:  Hypothyroidism  Thyroid stimulating hormone suppression therapy  Thyroid cancer          Passed - No active pregnancy on record     If patient is pregnant or has had a positive pregnancy test, please check TSH.          Passed - No positive pregnancy test in past 12 months     If patient is pregnant or has had a positive pregnancy test, please check TSH.

## 2024-04-24 RX ORDER — LEVOTHYROXINE SODIUM 125 UG/1
125 TABLET ORAL DAILY
Qty: 90 TABLET | Refills: 1 | OUTPATIENT
Start: 2024-04-24

## 2024-04-25 DIAGNOSIS — E03.9 HYPOTHYROIDISM, UNSPECIFIED TYPE: ICD-10-CM

## 2024-04-25 RX ORDER — LEVOTHYROXINE SODIUM 125 UG/1
125 TABLET ORAL DAILY
Qty: 30 TABLET | Refills: 0 | OUTPATIENT
Start: 2024-04-25

## 2024-05-03 ENCOUNTER — LAB (OUTPATIENT)
Dept: LAB | Facility: CLINIC | Age: 39
End: 2024-05-03
Payer: COMMERCIAL

## 2024-05-03 DIAGNOSIS — E03.9 HYPOTHYROIDISM, UNSPECIFIED TYPE: ICD-10-CM

## 2024-05-03 PROCEDURE — 36415 COLL VENOUS BLD VENIPUNCTURE: CPT

## 2024-05-03 PROCEDURE — 84443 ASSAY THYROID STIM HORMONE: CPT

## 2024-05-03 PROCEDURE — 84439 ASSAY OF FREE THYROXINE: CPT

## 2024-05-04 LAB
T4 FREE SERPL-MCNC: 1.67 NG/DL (ref 0.9–1.7)
TSH SERPL DL<=0.005 MIU/L-ACNC: 0.54 UIU/ML (ref 0.3–4.2)

## 2024-05-06 RX ORDER — LEVOTHYROXINE SODIUM 125 UG/1
125 TABLET ORAL DAILY
Qty: 90 TABLET | Refills: 0 | Status: SHIPPED | OUTPATIENT
Start: 2024-05-06 | End: 2024-09-04

## 2024-05-06 NOTE — TELEPHONE ENCOUNTER
RX sent.  Requested Prescriptions   Signed Prescriptions Disp Refills    levothyroxine (SYNTHROID/LEVOTHROID) 125 MCG tablet 90 tablet 0     Sig: Take 1 tablet (125 mcg) by mouth daily       There is no refill protocol information for this order      Refused Prescriptions Disp Refills    levothyroxine (SYNTHROID/LEVOTHROID) 125 MCG tablet [Pharmacy Med Name: LEVOTHYROXINE 125 MCG TABLET] 90 tablet 1     Sig: TAKE 1 TABLET BY MOUTH EVERY DAY       Thyroid Protocol Failed - 4/19/2024  8:33 AM        Failed - Normal TSH on file in past 12 months     Recent Labs   Lab Test 05/03/24  0732   TSH 0.54              Passed - Patient is 12 years or older        Passed - Recent (12 mo) or future (30 days) visit within the authorizing provider's specialty     The patient must have completed an in-person or virtual visit within the past 12 months or has a future visit scheduled within the next 90 days with the authorizing provider s specialty.  Urgent care and e-visits do not quality as an office visit for this protocol.          Passed - Medication is active on med list        Passed - Medication indicated for associated diagnosis     Medication is associated with one or more of the following diagnoses:  Hypothyroidism  Thyroid stimulating hormone suppression therapy  Thyroid cancer          Passed - No active pregnancy on record     If patient is pregnant or has had a positive pregnancy test, please check TSH.          Passed - No positive pregnancy test in past 12 months     If patient is pregnant or has had a positive pregnancy test, please check TSH.

## 2024-05-09 ENCOUNTER — PATIENT OUTREACH (OUTPATIENT)
Dept: CARE COORDINATION | Facility: CLINIC | Age: 39
End: 2024-05-09
Payer: COMMERCIAL

## 2024-08-27 ENCOUNTER — PATIENT OUTREACH (OUTPATIENT)
Dept: CARE COORDINATION | Facility: CLINIC | Age: 39
End: 2024-08-27
Payer: COMMERCIAL

## 2024-08-27 ENCOUNTER — TELEPHONE (OUTPATIENT)
Dept: FAMILY MEDICINE | Facility: CLINIC | Age: 39
End: 2024-08-27
Payer: COMMERCIAL

## 2024-08-27 DIAGNOSIS — Z12.31 ENCOUNTER FOR SCREENING MAMMOGRAM FOR MALIGNANT NEOPLASM OF BREAST: ICD-10-CM

## 2024-08-27 DIAGNOSIS — N60.02 CYST OF LEFT BREAST: Primary | ICD-10-CM

## 2024-08-27 NOTE — TELEPHONE ENCOUNTER
Regular screening mammogram is recommended at age 39--- we do diagnostic mammogram only if a specific concern or needed in follow-up of a prior abnormality.    In reviewing her chart, it looks like she was to have an ultrasound in follow-up of a cyst seen in her left breast 6 months after her last diagnostic breast mammo/US 9/2022.  Was this ever done?  If not, she should consider completing an ultrasound along with the routine screening mammogram.  Let me know as I could then place US order.      JOSHJ

## 2024-08-27 NOTE — TELEPHONE ENCOUNTER
Patient Returning Call    Reason for call:  Pt called to make mammo appt, but has only done diagnostic is the past. Does she need to stick with diagnostic or is a screening mammo sufficient now that she is 39?    Information relayed to patient:  Will leave TE for call back    Patient has additional questions:  No      Could we send this information to you in [place] or would you prefer to receive a phone call?:   Patient would prefer a phone call   Okay to leave a detailed message?: Yes at Cell number on file:    Telephone Information:   Mobile [phone]

## 2024-08-28 NOTE — TELEPHONE ENCOUNTER
Patient notified of below messag regarding ultrasound and screening mammogram, has not had the ultrasound and would like orders placed for that and the mammogram

## 2024-09-11 ENCOUNTER — HOSPITAL ENCOUNTER (OUTPATIENT)
Dept: MAMMOGRAPHY | Facility: CLINIC | Age: 39
Discharge: HOME | End: 2024-09-11
Attending: FAMILY MEDICINE
Payer: COMMERCIAL

## 2024-09-11 DIAGNOSIS — N60.02 CYST OF LEFT BREAST: ICD-10-CM

## 2024-09-11 PROCEDURE — 77062 BREAST TOMOSYNTHESIS BI: CPT

## 2024-09-11 PROCEDURE — 76642 ULTRASOUND BREAST LIMITED: CPT | Mod: LT

## 2024-11-07 ENCOUNTER — ANCILLARY PROCEDURE (OUTPATIENT)
Dept: GENERAL RADIOLOGY | Facility: CLINIC | Age: 39
End: 2024-11-07
Attending: NURSE PRACTITIONER
Payer: COMMERCIAL

## 2024-11-07 ENCOUNTER — OFFICE VISIT (OUTPATIENT)
Dept: FAMILY MEDICINE | Facility: CLINIC | Age: 39
End: 2024-11-07
Payer: COMMERCIAL

## 2024-11-07 VITALS
HEIGHT: 67 IN | HEART RATE: 79 BPM | WEIGHT: 150 LBS | SYSTOLIC BLOOD PRESSURE: 108 MMHG | TEMPERATURE: 97.7 F | OXYGEN SATURATION: 99 % | BODY MASS INDEX: 23.54 KG/M2 | RESPIRATION RATE: 16 BRPM | DIASTOLIC BLOOD PRESSURE: 76 MMHG

## 2024-11-07 DIAGNOSIS — J45.21 MILD INTERMITTENT ASTHMA WITH EXACERBATION: Primary | ICD-10-CM

## 2024-11-07 DIAGNOSIS — J06.9 VIRAL URI: ICD-10-CM

## 2024-11-07 DIAGNOSIS — R05.1 ACUTE COUGH: ICD-10-CM

## 2024-11-07 DIAGNOSIS — R06.2 WHEEZING: ICD-10-CM

## 2024-11-07 PROBLEM — R13.10 DYSPHAGIA: Status: ACTIVE | Noted: 2023-07-13

## 2024-11-07 PROBLEM — R14.0 ABDOMINAL BLOATING: Status: ACTIVE | Noted: 2024-11-07

## 2024-11-07 PROBLEM — R12 HEARTBURN: Status: ACTIVE | Noted: 2024-11-07

## 2024-11-07 PROBLEM — K64.9 HEMORRHOIDS: Status: ACTIVE | Noted: 2023-07-13

## 2024-11-07 PROBLEM — R19.8 IRREGULAR BOWEL HABITS: Status: ACTIVE | Noted: 2023-07-13

## 2024-11-07 PROBLEM — K57.30 DIVERTICULAR DISEASE OF LARGE INTESTINE: Status: ACTIVE | Noted: 2023-07-13

## 2024-11-07 PROBLEM — R14.0 ABDOMINAL DISTENSION, GASEOUS: Status: ACTIVE | Noted: 2023-07-05

## 2024-11-07 PROBLEM — R10.13 EPIGASTRIC PAIN: Status: ACTIVE | Noted: 2023-07-13

## 2024-11-07 PROCEDURE — 71046 X-RAY EXAM CHEST 2 VIEWS: CPT | Mod: TC | Performed by: RADIOLOGY

## 2024-11-07 PROCEDURE — 99214 OFFICE O/P EST MOD 30 MIN: CPT | Performed by: NURSE PRACTITIONER

## 2024-11-07 RX ORDER — PREDNISONE 20 MG/1
20 TABLET ORAL 2 TIMES DAILY
Qty: 10 TABLET | Refills: 0 | Status: SHIPPED | OUTPATIENT
Start: 2024-11-07 | End: 2024-11-12

## 2024-11-07 RX ORDER — BENZONATATE 200 MG/1
200 CAPSULE ORAL 3 TIMES DAILY PRN
Qty: 30 CAPSULE | Refills: 0 | Status: SHIPPED | OUTPATIENT
Start: 2024-11-07

## 2024-11-07 RX ORDER — ALBUTEROL SULFATE 90 UG/1
2 INHALANT RESPIRATORY (INHALATION) EVERY 4 HOURS PRN
Qty: 18 G | Refills: 0 | Status: SHIPPED | OUTPATIENT
Start: 2024-11-07

## 2024-11-07 ASSESSMENT — ASTHMA QUESTIONNAIRES
QUESTION_4 LAST FOUR WEEKS HOW OFTEN HAVE YOU USED YOUR RESCUE INHALER OR NEBULIZER MEDICATION (SUCH AS ALBUTEROL): NOT AT ALL
QUESTION_3 LAST FOUR WEEKS HOW OFTEN DID YOUR ASTHMA SYMPTOMS (WHEEZING, COUGHING, SHORTNESS OF BREATH, CHEST TIGHTNESS OR PAIN) WAKE YOU UP AT NIGHT OR EARLIER THAN USUAL IN THE MORNING: NOT AT ALL
ACT_TOTALSCORE: 25
ACT_TOTALSCORE: 25
QUESTION_2 LAST FOUR WEEKS HOW OFTEN HAVE YOU HAD SHORTNESS OF BREATH: NOT AT ALL
QUESTION_1 LAST FOUR WEEKS HOW MUCH OF THE TIME DID YOUR ASTHMA KEEP YOU FROM GETTING AS MUCH DONE AT WORK, SCHOOL OR AT HOME: NONE OF THE TIME
QUESTION_5 LAST FOUR WEEKS HOW WOULD YOU RATE YOUR ASTHMA CONTROL: COMPLETELY CONTROLLED

## 2024-11-07 ASSESSMENT — PAIN SCALES - GENERAL: PAINLEVEL_OUTOF10: NO PAIN (0)

## 2024-11-07 ASSESSMENT — PATIENT HEALTH QUESTIONNAIRE - PHQ9
SUM OF ALL RESPONSES TO PHQ QUESTIONS 1-9: 4
10. IF YOU CHECKED OFF ANY PROBLEMS, HOW DIFFICULT HAVE THESE PROBLEMS MADE IT FOR YOU TO DO YOUR WORK, TAKE CARE OF THINGS AT HOME, OR GET ALONG WITH OTHER PEOPLE: SOMEWHAT DIFFICULT
SUM OF ALL RESPONSES TO PHQ QUESTIONS 1-9: 4

## 2024-11-07 NOTE — PROGRESS NOTES
Assessment and Plan:       ICD-10-CM    1. Mild intermittent asthma with exacerbation  J45.21 predniSONE (DELTASONE) 20 MG tablet     albuterol (PROAIR HFA/PROVENTIL HFA/VENTOLIN HFA) 108 (90 Base) MCG/ACT inhaler      2. Viral URI  J06.9       3. Acute cough  R05.1 XR Chest 2 Views     benzonatate (TESSALON) 200 MG capsule      4. Wheezing  R06.2 XR Chest 2 Views     predniSONE (DELTASONE) 20 MG tablet     albuterol (PROAIR HFA/PROVENTIL HFA/VENTOLIN HFA) 108 (90 Base) MCG/ACT inhaler        X-ray shows no acute infiltrate or mass.  Will have radiology review.  Will treat asthma exacerbation with prednisone 20 mg twice daily for 5 days.  Educated on its indications and side effects.  Patient will use albuterol as needed.  Will treat cough with Tessalon Perles as needed.  If fever develops or cough worsens, suggest follow-up with Dr. Montalvo.  She is content with the plan.    Subjective:     Eli is a 39 year old female presenting to the clinic for concerns for cold symptoms for 2 weeks.  Patient complains of productive cough, postnasal drainage, fatigue, shortness of breath, chest tightness.  She denies wheezing.  She has had an intermittent headache.  She denies sinus congestion, stomachache, nausea, vomiting, fever.  She has not lost her sense of smell or taste.  She has been taking over-the-counter NyQuil, DayQuil, and Mucinex.  She has tried an allergy medication.  Her fiancé is also ill.  She has not traveled recently.  She has a history of exercise-induced asthma.  Patient has had difficulty sleeping due to the cough.    Reviewof Systems: A complete 14 point review of systems was obtained and is negative or as stated in the history of present illness.    Social History     Socioeconomic History    Marital status:      Spouse name: Not on file    Number of children: Not on file    Years of education: Not on file    Highest education level: Not on file   Occupational History    Not on file   Tobacco  Use    Smoking status: Never     Passive exposure: Never    Smokeless tobacco: Never   Vaping Use    Vaping status: Never Used   Substance and Sexual Activity    Alcohol use: Yes     Alcohol/week: 4.0 standard drinks of alcohol    Drug use: Never    Sexual activity: Yes     Partners: Male     Comment: in a relationship   Other Topics Concern    Not on file   Social History Narrative    Not on file     Social Drivers of Health     Financial Resource Strain: Not on file   Food Insecurity: Not on file   Transportation Needs: Not on file   Physical Activity: Not on file   Stress: Not on file   Social Connections: Not on file   Interpersonal Safety: Low Risk  (11/7/2024)    Interpersonal Safety     Do you feel physically and emotionally safe where you currently live?: Yes     Within the past 12 months, have you been hit, slapped, kicked or otherwise physically hurt by someone?: No     Within the past 12 months, have you been humiliated or emotionally abused in other ways by your partner or ex-partner?: No   Housing Stability: Not on file       Active Ambulatory Problems     Diagnosis Date Noted    Colitis     Elevated C-reactive Protein     Headache     Hypothyroidism 12/17/2014    Anxiety 05/18/2020    Attention deficit hyperactivity disorder (ADHD), combined type 05/18/2020    Mild episode of recurrent major depressive disorder (H) 05/18/2020    Asthma 03/30/2021    Gastroesophageal reflux disease 03/30/2021    Mixed stress and urge urinary incontinence 03/30/2021    Uterovaginal prolapse 03/30/2021    Obesity (BMI 35.0-39.9) with comorbidity (H) 07/09/2021    Diarrhea 03/29/2022    Noninfectious gastroenteritis 11/10/2009    Nonspecific abdominal pain 11/09/2021    Vitamin D deficiency 03/28/2023    Abdominal bloating 11/07/2024    Abdominal distension, gaseous 07/05/2023    Disorder relating to short gestation and low birthweight 09/25/2013    Diverticular disease of large intestine 07/13/2023    Dysphagia 07/13/2023  "   Epigastric pain 07/13/2023    Heartburn 11/07/2024    Hemorrhoids 07/13/2023    Irregular bowel habits 07/13/2023     Resolved Ambulatory Problems     Diagnosis Date Noted    Nausea     Chest Tightness Or Heavy Pressure      No Additional Past Medical History       Family History   Problem Relation Age of Onset    Thyroid Disease Mother        Objective:     /76   Pulse 79   Temp 97.7  F (36.5  C)   Resp 16   Ht 1.689 m (5' 6.5\")   Wt 68 kg (150 lb)   SpO2 99%   Breastfeeding No   BMI 23.85 kg/m      Patient is alert, in no obvious distress.   Skin: Warm, dry.  No lesions or rashes.  Skin turgor rapid return.   HEENT:  Head normocephalic, atraumatic.  Eyes normal. Ears normal.  Nose patent, mucosa red.  Oropharynx erythematous.  No lesions or tonsillar enlargement.   Neck: Supple, no lymphadenopathy.   Lungs: Expiratory wheeze heard in right upper lung.  Respirations even and unlabored.  No rhonchi or rales noted.   Heart:  Regular rate and rhythm.  No murmurs.       LABORATORY: I ordered and personally reviewed chest x-rays showing no obvious infiltrate or mass.  Will have radiology review.                Answers submitted by the patient for this visit:  Patient Health Questionnaire (Submitted on 11/7/2024)  If you checked off any problems, how difficult have these problems made it for you to do your work, take care of things at home, or get along with other people?: Somewhat difficult  PHQ9 TOTAL SCORE: 4  General Questionnaire (Submitted on 11/7/2024)  Chief Complaint: Chronic problems general questions HPI Form  How many days per week do you miss taking your medication?: 0  General Concern (Submitted on 11/7/2024)  Chief Complaint: Chronic problems general questions HPI Form  What is the reason for your visit today?: trouble breathing,cough  When did your symptoms begin?: 1-2 weeks ago  How would you describe these symptoms?: Moderate  Are your symptoms:: Worsening  Have you had these symptoms " before?: No  Is there anything that makes you feel worse?: worsening at night  Is there anything that makes you feel better?: no  Questionnaire about: Chronic problems general questions HPI Form (Submitted on 11/7/2024)  Chief Complaint: Chronic problems general questions HPI Form

## 2025-01-12 ENCOUNTER — HEALTH MAINTENANCE LETTER (OUTPATIENT)
Age: 40
End: 2025-01-12

## 2025-02-10 ENCOUNTER — NURSE TRIAGE (OUTPATIENT)
Dept: FAMILY MEDICINE | Facility: CLINIC | Age: 40
End: 2025-02-10
Payer: COMMERCIAL

## 2025-02-10 ENCOUNTER — OFFICE VISIT (OUTPATIENT)
Dept: URGENT CARE | Facility: URGENT CARE | Age: 40
End: 2025-02-10
Payer: COMMERCIAL

## 2025-02-10 VITALS
WEIGHT: 152.3 LBS | HEART RATE: 65 BPM | RESPIRATION RATE: 18 BRPM | OXYGEN SATURATION: 98 % | DIASTOLIC BLOOD PRESSURE: 78 MMHG | TEMPERATURE: 98 F | SYSTOLIC BLOOD PRESSURE: 114 MMHG | BODY MASS INDEX: 24.21 KG/M2

## 2025-02-10 DIAGNOSIS — M54.6 ACUTE RIGHT-SIDED THORACIC BACK PAIN: ICD-10-CM

## 2025-02-10 DIAGNOSIS — R10.11 RUQ ABDOMINAL PAIN: Primary | ICD-10-CM

## 2025-02-10 LAB
ALBUMIN SERPL-MCNC: 4 G/DL (ref 3.4–5)
ALBUMIN UR-MCNC: NEGATIVE MG/DL
ALP SERPL-CCNC: 39 U/L (ref 40–150)
ALT SERPL W P-5'-P-CCNC: 10 U/L (ref 0–50)
ANION GAP SERPL CALCULATED.3IONS-SCNC: 5 MMOL/L (ref 3–14)
APPEARANCE UR: CLEAR
AST SERPL W P-5'-P-CCNC: 18 U/L (ref 0–45)
BACTERIA #/AREA URNS HPF: ABNORMAL /HPF
BASOPHILS # BLD AUTO: 0 10E3/UL (ref 0–0.2)
BASOPHILS NFR BLD AUTO: 0 %
BILIRUB SERPL-MCNC: 0.8 MG/DL (ref 0.2–1.3)
BILIRUB UR QL STRIP: NEGATIVE
BUN SERPL-MCNC: 9 MG/DL (ref 7–30)
CALCIUM SERPL-MCNC: 9.6 MG/DL (ref 8.5–10.1)
CHLORIDE BLD-SCNC: 110 MMOL/L (ref 94–109)
CO2 SERPL-SCNC: 27 MMOL/L (ref 20–32)
COLOR UR AUTO: YELLOW
CREAT SERPL-MCNC: 0.7 MG/DL (ref 0.52–1.04)
EGFRCR SERPLBLD CKD-EPI 2021: >90 ML/MIN/1.73M2
EOSINOPHIL # BLD AUTO: 0.1 10E3/UL (ref 0–0.7)
EOSINOPHIL NFR BLD AUTO: 1 %
ERYTHROCYTE [DISTWIDTH] IN BLOOD BY AUTOMATED COUNT: 12.4 % (ref 10–15)
GLUCOSE BLD-MCNC: 92 MG/DL (ref 70–99)
GLUCOSE UR STRIP-MCNC: NEGATIVE MG/DL
HCT VFR BLD AUTO: 40.1 % (ref 35–47)
HGB BLD-MCNC: 13.6 G/DL (ref 11.7–15.7)
HGB UR QL STRIP: ABNORMAL
IMM GRANULOCYTES # BLD: 0 10E3/UL
IMM GRANULOCYTES NFR BLD: 0 %
KETONES UR STRIP-MCNC: 15 MG/DL
LEUKOCYTE ESTERASE UR QL STRIP: NEGATIVE
LYMPHOCYTES # BLD AUTO: 3.5 10E3/UL (ref 0.8–5.3)
LYMPHOCYTES NFR BLD AUTO: 48 %
MCH RBC QN AUTO: 32.3 PG (ref 26.5–33)
MCHC RBC AUTO-ENTMCNC: 33.9 G/DL (ref 31.5–36.5)
MCV RBC AUTO: 95 FL (ref 78–100)
MONOCYTES # BLD AUTO: 0.6 10E3/UL (ref 0–1.3)
MONOCYTES NFR BLD AUTO: 8 %
NEUTROPHILS # BLD AUTO: 3.2 10E3/UL (ref 1.6–8.3)
NEUTROPHILS NFR BLD AUTO: 44 %
NITRATE UR QL: NEGATIVE
PH UR STRIP: 5.5 [PH] (ref 5–8)
PLATELET # BLD AUTO: 232 10E3/UL (ref 150–450)
POTASSIUM BLD-SCNC: 4.2 MMOL/L (ref 3.4–5.3)
PROT SERPL-MCNC: 6.6 G/DL (ref 6.8–8.8)
RBC # BLD AUTO: 4.21 10E6/UL (ref 3.8–5.2)
RBC #/AREA URNS AUTO: ABNORMAL /HPF
SODIUM SERPL-SCNC: 142 MMOL/L (ref 135–145)
SP GR UR STRIP: 1.02 (ref 1–1.03)
SQUAMOUS #/AREA URNS AUTO: ABNORMAL /LPF
UROBILINOGEN UR STRIP-ACNC: 0.2 E.U./DL
WBC # BLD AUTO: 7.2 10E3/UL (ref 4–11)
WBC #/AREA URNS AUTO: ABNORMAL /HPF
WBC CLUMPS #/AREA URNS HPF: ABNORMAL /HPF

## 2025-02-10 PROCEDURE — 80053 COMPREHEN METABOLIC PANEL: CPT | Performed by: PHYSICIAN ASSISTANT

## 2025-02-10 PROCEDURE — 85025 COMPLETE CBC W/AUTO DIFF WBC: CPT | Performed by: PHYSICIAN ASSISTANT

## 2025-02-10 PROCEDURE — 81001 URINALYSIS AUTO W/SCOPE: CPT | Performed by: PHYSICIAN ASSISTANT

## 2025-02-10 PROCEDURE — 83690 ASSAY OF LIPASE: CPT | Performed by: PHYSICIAN ASSISTANT

## 2025-02-10 PROCEDURE — 99214 OFFICE O/P EST MOD 30 MIN: CPT | Performed by: PHYSICIAN ASSISTANT

## 2025-02-10 PROCEDURE — 36415 COLL VENOUS BLD VENIPUNCTURE: CPT | Performed by: PHYSICIAN ASSISTANT

## 2025-02-10 NOTE — TELEPHONE ENCOUNTER
Provider Recommendation Follow Up:   Reached patient/caregiver. Informed of provider's recommendations. Patient verbalized understanding and agrees with the plan.           LORI Jordan, RN  [facility]

## 2025-02-10 NOTE — TELEPHONE ENCOUNTER
Nurse Triage SBAR    Is this a 2nd Level Triage? YES, LICENSED PRACTITIONER REVIEW IS REQUIRED    Situation:  Telephone call into the clinic, from the patient, on 2/10/25, to report upper abdominal pain, since Saturday, 2/8/25    Background:   Hx of asthma, GERD, obesity, diverticulosis, hypothyroidism, urinary incontinence, MDD, ADHD, and irregular bowel habits    Assessment:   Patient reports upper abdominal pain, under the right side of her ribs, that started on Saturday, 2/8/25, which is now radiating into her mid back below her ribs    Stated that the upper right quadrant pain is a stabbing pain that lasts for 3-5 seconds at a time and will come back multiple times in an hour, such as 7 times in an hour    Pain comes and goes in severity of sharpness, but has a constant low level of dull pain otherwise-stabbing pain is rated as 7 out of ten and patient reports wincing from the pain    Also reports nausea at this time    Denies chest pain, trouble breathing, sweating, fever, vomiting, diarrhea, headache, dizziness, lightheadedness, dysuria, changes to vision, weakness or numbness anywhere, fainting, falling, LOC, abdominal or back injury, redness/swelling/open areas, or neck/jaw/arm pain    Protocol Recommended Disposition:   Go To ED/UCC Now (Or To Office With PCP Approval)    Recommendation:   Gave care advice. Recommended that the patient be evaluated in the ED or UC per disposition, as there are no available appointments left at the [facility] today.    Patient verbalized understanding, but declined to go to the ED or UC now.    Patient stated that she thinks she may go to the [facility] later today, but needs to figure out  and is hoping that the pain may go away on its own.    Writer will route the above message to the PCP to review and advise next steps.    Denies other questions or concerns at this time.    Routed to provider    Does the patient meet one of the following criteria for ADS  visit consideration? 16+ years old, with an [facility] PCP     TIP  Providers, please consider if this condition is appropriate for management at one of our Acute and Diagnostic Services sites.     If patient is a good candidate, please use dotphrase <dot>triageresponse and select Refer to ADS to document.     Reason for Disposition   MILD TO MODERATE constant pain lasting > 2 hours    Additional Information   Negative: SEVERE difficulty breathing (e.g., struggling for each breath, speaks in single words)   Negative: Shock suspected (e.g., cold/pale/clammy skin, too weak to stand, low BP, rapid pulse)   Negative: Difficult to awaken or acting confused (e.g., disoriented, slurred speech)   Negative: Passed out (e.g., fainted, lost consciousness, blacked out and was not responding)   Negative: Visible sweat on face or sweat is dripping down   Negative: Sounds like a life-threatening emergency to the triager   Negative: Followed an abdomen (stomach) injury   Negative: Chest pain   Negative: Abdominal pain and pregnant < 20 weeks   Negative: Abdominal pain and pregnant 20 or more weeks   Negative: Abdomen bloating or swelling are main symptoms   Negative: SEVERE abdominal pain (e.g., excruciating)   Negative: Pain lasting > 10 minutes and over 50 years old   Negative: Pain lasting > 10 minutes and over 40 years old and associated chest, arm, neck, upper back, or jaw pain   Negative: Pain lasting > 10 minutes and over 35 years old and at least one cardiac risk factor (e.g., diabetes, high cholesterol, hypertension, obesity, smoker or strong family history of heart disease)   Negative: Pain lasting > 10 minutes and history of heart disease (i.e., heart attack, bypass surgery, angina, angioplasty, CHF)   Negative: Pain lasts > 10 minutes and difficulty breathing   Negative: Vomiting red blood or black (coffee ground) material  (Exception: Few streaks and only occurred once.)   Negative: Blood in bowel movements  (Exception:  "Blood on surface of BM with constipation.)   Negative: Black or tarry bowel movements  (Exception: Chronic-unchanged black-grey BMs AND is taking iron pills or Pepto-Bismol.)   Negative: Pregnant 20 weeks or more and new hand or face swelling    Answer Assessment - Initial Assessment Questions  1. LOCATION: \"Where does it hurt?\"         Upper right abdomen under ribs, which radiated into mid back around ribs      2. RADIATION: \"Does the pain shoot anywhere else?\" (e.g., chest, back)        See above    3. ONSET: \"When did the pain begin?\" (e.g., minutes, hours or days ago)         Started on Saturday, 2/8/25 in abdomina, but started in back last night    4. SUDDEN: \"Gradual or sudden onset?\"        Stabbing pain that lasts for 3-5 seconds and will come back multiple times in an hour, such as 7 times in an hour    5. PATTERN \"Does the pain come and go, or is it constant?\"        Comes and goes in sharpness, but has a low level of dull pain otherwise    6. SEVERITY: \"How bad is the pain?\"  (e.g., Scale 1-10; mild, moderate, or severe)        7 out of ten-wincing from pain    7. RECURRENT SYMPTOM: \"Have you ever had this type of stomach pain before?\" If Yes, ask: \"When was the last time?\" and \"What happened that time?\"         None noted    8. AGGRAVATING FACTORS: \"Does anything seem to cause this pain?\" (e.g., foods, stress, alcohol)        unknown      9. CARDIAC SYMPTOMS: \"Do you have any of the following symptoms: chest pain, difficulty breathing, sweating, nausea?\"        Denies chest pain, trouble breathing, sweating, fever, vomiting, diarrhea, headache, dizziness, lightheadedness, dysuria, changes to vision, weakness or numbness anywhere, fainting, falling, LOC, abdominal or back injury, redness/swelling/open areas    Nausea,       10. OTHER SYMPTOMS: \"Do you have any other symptoms?\" (e.g., back pain, diarrhea, fever, urination pain, vomiting)            N/A      11. PREGNANCY: \"Is there any chance you are " "pregnant?\" \"When was your last menstrual period?\"            N/A-has an IUD    Protocols used: Abdominal Pain - Upper-A-[place]    Madelin Gutierrez RN, BSN  [facility]    "

## 2025-02-10 NOTE — Clinical Note
Hello ADS,  This patient is experiencing right upper quadrant abdominal pain.  Seen in [facility] urgent care at around 6:00 PM.  Physical exam is stable, vitally stable, labs reassuring.  Given severity of pain I do recommend she have some further evaluation.  She is established within [facility].  I was hoping that she could be seen on 2/11/2025.  She was instructed to be n.p.o. prior to coming in.  Thank you,  -Cuca Gifford PA-C

## 2025-02-11 ENCOUNTER — OFFICE VISIT (OUTPATIENT)
Dept: PEDIATRICS | Facility: CLINIC | Age: 40
End: 2025-02-11
Payer: COMMERCIAL

## 2025-02-11 ENCOUNTER — HOSPITAL ENCOUNTER (OUTPATIENT)
Dept: ULTRASOUND IMAGING | Facility: HOSPITAL | Age: 40
Discharge: HOME | End: 2025-02-11
Attending: PHYSICIAN ASSISTANT
Payer: COMMERCIAL

## 2025-02-11 VITALS
BODY MASS INDEX: 23.8 KG/M2 | TEMPERATURE: 98 F | OXYGEN SATURATION: 100 % | RESPIRATION RATE: 16 BRPM | WEIGHT: 149.7 LBS | DIASTOLIC BLOOD PRESSURE: 78 MMHG | SYSTOLIC BLOOD PRESSURE: 115 MMHG | HEART RATE: 70 BPM

## 2025-02-11 DIAGNOSIS — K21.9 GASTROESOPHAGEAL REFLUX DISEASE WITHOUT ESOPHAGITIS: ICD-10-CM

## 2025-02-11 DIAGNOSIS — R10.11 RUQ ABDOMINAL PAIN: ICD-10-CM

## 2025-02-11 DIAGNOSIS — R10.11 RUQ ABDOMINAL PAIN: Primary | ICD-10-CM

## 2025-02-11 LAB — LIPASE SERPL-CCNC: 33 U/L (ref 13–60)

## 2025-02-11 PROCEDURE — 99215 OFFICE O/P EST HI 40 MIN: CPT | Performed by: PHYSICIAN ASSISTANT

## 2025-02-11 PROCEDURE — 76705 ECHO EXAM OF ABDOMEN: CPT

## 2025-02-11 RX ORDER — OMEPRAZOLE 40 MG/1
40 CAPSULE, DELAYED RELEASE ORAL DAILY
Qty: 30 CAPSULE | Refills: 1 | Status: SHIPPED | OUTPATIENT
Start: 2025-02-11

## 2025-02-11 ASSESSMENT — PAIN SCALES - GENERAL: PAINLEVEL_OUTOF10: MILD PAIN (3)

## 2025-02-11 NOTE — PROGRESS NOTES
Acute and Diagnostic Services Clinic Visit    {PROVIDER CHARTING PREFERENCE:737645}    Mine Hoang is a 39 year old, presenting for the following health issues:  Abdominal Pain (RUQ radiating to the back right side)  {(!) Visit Details have not yet been documented.  Please enter Visit Details and then use this list to pull in documentation. (Optional):300988}  HPI     Abdominal/Flank Pain  Onset/Duration: Saturday  Description:   Character: Dull ache  Location: right upper quadrant  Radiation: Back - Right side (sharp pain)  Intensity: 3/10, when wave of sharp pain comes, 8/10  Progression of Symptoms:  intermittent  Accompanying Signs & Symptoms:  Fever/chills: YES- Chills but no fever  Gas/Bloating: YES- Gas and a little bloating  Nausea: YES  Vomitting: no   Diarrhea: no - but had two   Constipation:no - not regular BM but not full constipation   Dysuria: no            Hematuria: YES- trace amounts in the UA yesterday           Frequency: no            Incontinence of urine: no   History:            Last bowel movement: yesterday - last night, a little bit, not a full empty  Trauma: no   Previous similar pain: no    Previous tests done: Labs and a UA           Previous Abdominal surgery: YES- Ovarian cyst removed  Precipitating factors:   Does the pain change with:     Food: no - however, pt was told not to eat from yesterday's appt and today does feel slightly better. Slightly nausea.     Bowel Movement: no     Urination: no              Other factors: no   Therapies tried and outcome:  Tylenol - Patient was able to sleep, helped a little.    When food last eaten: Last night around 6:45 pm.      {ROS Picklists (Optional):493530}      Objective    /78 (BP Location: Right arm, Patient Position: Sitting, Cuff Size: Adult Regular)   Pulse 70   Temp 98  F (36.7  C) (Oral)   Resp 16   Wt 67.9 kg (149 lb 11.2 oz)   SpO2 100%   BMI 23.80 kg/m    Body mass index is 23.8 kg/m .  Physical Exam   {Exam  List (Optional):413396}    {Diagnostic Test Results (Optional):327333}        Signed Electronically by: Kely Peters PA-C  {Email feedback regarding this note to [email or web address]   :[id number]}

## 2025-02-11 NOTE — PATIENT INSTRUCTIONS
This evening before you are going to bed I recommend taking 1000 mg of Tylenol and 600 mg of Ibuprofen.  When you wake up please do not eat or drink anything.  You can brush your teeth and if there are medications you need to take take them a small sip of water, but otherwise no eating or drinking.  I placed a referral to the acute diagnostic services clinic.  They typically will call you between 9 AM and 10 AM and ask you to come in for further evaluation.  For now we are checking your liver enzymes, kidney function, pancreatic enzyme, and a complete blood count.  So far your urine test was not too exciting without signs of infection or more large amounts of blood.  Seek emergency medical attention if your developing more severe and persistent abdominal pain or fevers associated with your abdominal pain.

## 2025-02-11 NOTE — PROGRESS NOTES
Patient presents with:  Abdominal Pain: Symptoms started Saturday. URQ pain that is now radiating to flank. 7-8 pain at times. Pain comes and goes. No HX of liver, kidney problems.       Clinical Decision Making:  Patient experiencing intermittent intense right upper quadrant abdominal pain that lasts 3 seconds at a time but quite frequent on average every 4 min.  Exam is relatively reassuring, but laying on her back did make pain more persistent.  No CVA tenderness.  UA is negative for signs of infection.  Main suspicion is for cholecystitis.  Unable to further evaluate this here in urgent care clinic at this time as it is evening.  Since patient is established I started workup today including CMP, CBC, lipase.  I we will plan to have the patient following up with the acute diagnostic services clinic in the morning.      ICD-10-CM    1. RUQ abdominal pain  R10.11 Comprehensive metabolic panel (BMP + Alb, Alk Phos, ALT, AST, Total. Bili, TP)     Lipase     CBC with platelets and differential     Referral to Acute and Diagnostic Services (Day of diagnostic / First order acute)     Comprehensive metabolic panel (BMP + Alb, Alk Phos, ALT, AST, Total. Bili, TP)     Lipase     CBC with platelets and differential      2. Back pain  M54.9 UA Macroscopic with reflex to Microscopic and Culture - Clinic Collect     UA Microscopic with Reflex to Culture          Patient Instructions   This evening before you are going to bed I recommend taking 1000 mg of Tylenol and 600 mg of Ibuprofen.  When you wake up please do not eat or drink anything.  You can brush your teeth and if there are medications you need to take take them a small sip of water, but otherwise no eating or drinking.  I placed a referral to the acute diagnostic services clinic.  They typically will call you between 9 AM and 10 AM and ask you to come in for further evaluation.  For now we are checking your liver enzymes, kidney function, pancreatic enzyme, and a  complete blood count.  So far your urine test was not too exciting without signs of infection or more large amounts of blood.  Seek emergency medical attention if your developing more severe and persistent abdominal pain or fevers associated with your abdominal pain.    HPI:  Eli Pineda is a 39 year old female who presents today with concerns of right upper quadrant abdominal pain with radiation to the right flank that started 2 days ago.  Pain comes and goes.  No known history of kidney or liver issues. Patient has had chills, but no fevers. She has had an ovarian cyst removed, but no other abdominal surgeries. Patient states that she has 0-1 drinks/week for over a year. No palliative or provocative factors. Is comes for 3 seconds at a time and happeneing 5 times every 20 min level 7/10 pain. Like a contraction.     History obtained from the patient.    Problem List:  2024-11: Abdominal bloating  2024-11: Heartburn  2023-07: Diverticular disease of large intestine  2023-07: Dysphagia  2023-07: Epigastric pain  2023-07: Hemorrhoids  2023-07: Irregular bowel habits  2023-07: Abdominal distension, gaseous  2023-03: Vitamin D deficiency  2022-03: Diarrhea  2021-11: Nonspecific abdominal pain  2021-07: Obesity (BMI 35.0-39.9) with comorbidity (H)  2021-03: Asthma  2021-03: Gastroesophageal reflux disease  2021-03: Mixed stress and urge urinary incontinence  2021-03: Uterovaginal prolapse  2020-05: Anxiety  2020-05: Attention deficit hyperactivity disorder (ADHD), combined   type  2020-05: Mild episode of recurrent major depressive disorder  2014-12: Hypothyroidism  2013-09: Disorder relating to short gestation and low birthweight  2009-11: Noninfectious gastroenteritis  Colitis  Elevated C-reactive Protein  Nausea  Headache  Chest Tightness Or Heavy Pressure      No past medical history on file.    Social History     Tobacco Use    Smoking status: Never     Passive exposure: Never    Smokeless tobacco: Never    Substance Use Topics    Alcohol use: Yes     Alcohol/week: 4.0 standard drinks of alcohol         Review of Systems    Vitals:    02/10/25 1759   BP: 114/78   Pulse: 65   Resp: 18   Temp: 98  F (36.7  C)   TempSrc: Oral   SpO2: 98%   Weight: 69.1 kg (152 lb 4.8 oz)       Physical Exam  Vitals and nursing note reviewed.   Constitutional:       General: She is not in acute distress.     Appearance: She is not toxic-appearing or diaphoretic.   HENT:      Head: Normocephalic and atraumatic.      Right Ear: External ear normal.      Left Ear: External ear normal.   Eyes:      Conjunctiva/sclera: Conjunctivae normal.   Cardiovascular:      Rate and Rhythm: Normal rate and regular rhythm.      Heart sounds: No murmur heard.  Pulmonary:      Effort: Pulmonary effort is normal. No respiratory distress.   Abdominal:      General: Abdomen is flat. There is no distension.      Palpations: There is no hepatomegaly or mass.      Tenderness: There is abdominal tenderness. There is no right CVA tenderness, left CVA tenderness, guarding or rebound. Positive signs include Mccartney's sign. Negative signs include Rovsing's sign, McBurney's sign, psoas sign and obturator sign.      Hernia: No hernia is present.   Neurological:      Mental Status: She is alert.   Psychiatric:         Mood and Affect: Mood normal.         Behavior: Behavior normal.         Thought Content: Thought content normal.         Judgment: Judgment normal.         Results:  Results for orders placed or performed in visit on 02/10/25   UA Macroscopic with reflex to Microscopic and Culture - Clinic Collect     Status: Abnormal    Specimen: Urine, Clean Catch   Result Value Ref Range    Color Urine Yellow Colorless, Straw, Light Yellow, Yellow    Appearance Urine Clear Clear    Glucose Urine Negative Negative mg/dL    Bilirubin Urine Negative Negative    Ketones Urine 15 (A) Negative mg/dL    Specific Gravity Urine 1.020 1.005 - 1.030    Blood Urine Trace (A)  Negative    pH Urine 5.5 5.0 - 8.0    Protein Albumin Urine Negative Negative mg/dL    Urobilinogen Urine 0.2 0.2, 1.0 E.U./dL    Nitrite Urine Negative Negative    Leukocyte Esterase Urine Negative Negative   UA Microscopic with Reflex to Culture     Status: Abnormal   Result Value Ref Range    Bacteria Urine Few (A) None Seen /HPF    RBC Urine 5-10 (A) 0-2 /HPF /HPF    WBC Urine 0-5 0-5 /HPF /HPF    Squamous Epithelials Urine Few (A) None Seen /LPF    WBC Clumps Urine None Seen None Seen /HPF    Narrative    Urine Culture not indicated   Comprehensive metabolic panel (BMP + Alb, Alk Phos, ALT, AST, Total. Bili, TP)     Status: Abnormal   Result Value Ref Range    Sodium 142 135 - 145 mmol/L    Potassium 4.2 3.4 - 5.3 mmol/L    Chloride 110 (H) 94 - 109 mmol/L    Carbon Dioxide (CO2) 27 20 - 32 mmol/L    Anion Gap 5 3 - 14 mmol/L    Urea Nitrogen 9 7 - 30 mg/dL    Creatinine 0.70 0.52 - 1.04 mg/dL    GFR Estimate >90 >60 mL/min/1.73m2    Calcium 9.6 8.5 - 10.1 mg/dL    Glucose 92 70 - 99 mg/dL    Alkaline Phosphatase 39 (L) 40 - 150 U/L    AST 18 0 - 45 U/L    ALT 10 0 - 50 U/L    Protein Total 6.6 (L) 6.8 - 8.8 g/dL    Albumin 4.0 3.4 - 5.0 g/dL    Bilirubin Total 0.8 0.2 - 1.3 mg/dL   CBC with platelets and differential     Status: None   Result Value Ref Range    WBC Count 7.2 4.0 - 11.0 10e3/uL    RBC Count 4.21 3.80 - 5.20 10e6/uL    Hemoglobin 13.6 11.7 - 15.7 g/dL    Hematocrit 40.1 35.0 - 47.0 %    MCV 95 78 - 100 fL    MCH 32.3 26.5 - 33.0 pg    MCHC 33.9 31.5 - 36.5 g/dL    RDW 12.4 10.0 - 15.0 %    Platelet Count 232 150 - 450 10e3/uL    % Neutrophils 44 %    % Lymphocytes 48 %    % Monocytes 8 %    % Eosinophils 1 %    % Basophils 0 %    % Immature Granulocytes 0 %    Absolute Neutrophils 3.2 1.6 - 8.3 10e3/uL    Absolute Lymphocytes 3.5 0.8 - 5.3 10e3/uL    Absolute Monocytes 0.6 0.0 - 1.3 10e3/uL    Absolute Eosinophils 0.1 0.0 - 0.7 10e3/uL    Absolute Basophils 0.0 0.0 - 0.2 10e3/uL    Absolute  Immature Granulocytes 0.0 <=0.4 10e3/uL   CBC with platelets and differential     Status: None    Narrative    The following orders were created for panel order CBC with platelets and differential.  Procedure                               Abnormality         Status                     ---------                               -----------         ------                     CBC with platelets and d...[[id number]]                      Final result                 Please view results for these tests on the individual orders.         At the end of the encounter, I discussed results, diagnosis, medications. Discussed red flags for immediate return to clinic/ER, as well as indications for follow up if no improvement. Patient understood and agreed to plan. Patient was stable for discharge.

## 2025-02-11 NOTE — PATIENT INSTRUCTIONS
Continue famotidine twice daily.   Submit stool sample.   Start omeprazole daily.   Avoid raw vegetables/salads, caffeine, alcohol, spicy foods, NSAIDs (ibuprofen, Advil, naproxen).   Follow up with [facility] if not improving; go to ER if worsening.    Gastritis    Gastritis is inflammation and irritation of the stomach lining. You can have it for a short time (acute) or be long lasting (chronic). Infection with bacteria called H pylori most often causes gastritis. More than a third of people in the [place] have these bacteria in their bodies. In many cases, H pylori causes no problems or symptoms. In some people, though, the infection irritates the stomach lining and causes gastritis. H. pylori may be diagnosed through blood, stool, or breath tests, we well as through biopsy during an endoscopy. Other causes of stomach irritation include drinking alcohol, smoking or chewing tobacco, or taking pain-relieving medicines called NSAIDs (such as aspirin or ibuprofen). Certain drugs (such as cocaine) and immune conditions can also cause gastritis.  Symptoms of gastritis can include:  Belly pain or bloating  Feeling full quickly  Loss of appetite  Nausea or vomiting  Vomiting blood or having black stools  Feeling more tired than usual  An inflamed and irritated stomach lining is more likely to develop a sore called an ulcer. To help prevent this, gastritis should be treated.  Home care  Lifestyle changes can help reduce symptoms. If needed, your healthcare provider may prescribe medicines. Symptoms often improve with treatment, but if treatment is stopped, the symptoms often return after a few months. So most persons with GERD will need to continue treatment or get treatment on and off.  Lifestyle changes  Drinking six to eight glasses of water daily and eating high-fiber, low-fat foods (for example, fruits, vegetables, and whole-grain breads and cereals) are recommended. Drinking carbonated beverages, alcohol, and caffeinated  "beverages; eating high-fat and spicy foods; and smoking are discouraged. Some foods, such as beans, cabbage, and cauliflower, naturally produce gas and should be limited or avoided. Meals that are small, regular, and frequent are encouraged because they are easier to digest than large ones.   Don t eat large meals, especially at night. Frequent, smaller meals are best. Don't lie down right after eating. And don t eat anything 3 hours before going to bed.  Don't drink alcohol or smoke. As much as possible, stay away from second hand smoke.  If you are overweight, losing weight will reduce symptoms.   Don't wear tight clothing around your stomach area.  If your symptoms occur during sleep, use a foam wedge to elevate your upper body (not just your head.) Or, place 4\" blocks under the head of your bed. Or use 2 bed risers under your bedframe.  Medicines  If needed, medicines can help relieve the symptoms of GERD and prevent damage to the esophagus. Discuss a medicine plan with your healthcare provider. This may include one or more of the following medicines:  Antacids to help neutralize the normal acids in your stomach.  Acid blockers (Histamine or H2 blockers) to decrease acid production.  Acid inhibitors (proton pump inhibitors PPIs) to decrease acid production in a different way than the blockers. They may work better, but can take a little longer to take effect.  Take an antacid 30 to 60 minutes after eating and at bedtime, but not at the same time as an acid blocker.  Try not to take medicines such as ibuprofen and aspirin. If you are taking aspirin for your heart or other medical reasons, talk to your healthcare provider about stopping it.If needed, our healthcare provider may prescribe medicines. If you have H pylori infection, treating it will likely relieve your symptoms. Other changes can help reduce stomach irritation and help it heal.  If you have been prescribed medicines for H pylori infection, take them " as directed. Take all of the medicine until it is finished or your healthcare provider tells you to stop, even if you feel better.  Follow-up care  Follow up with your healthcare provider, or as advised by our staff. You may need testing to check for inflammation or an ulcer.  When to seek medical advice  Call your healthcare provider for any of the following:  Stomach pain that gets worse or moves to the lower right belly (appendix area)  Chest pain that appears or gets worse, or spreads to the back, neck, shoulder, or arm  Frequent vomiting (can t keep down liquids)  Blood in the stool or vomit (red or black in color)  Feeling weak or dizzy  Shortness of breath  Unexplained weight loss  Fever of 100.4 F (38 C) or higher, or as directed by your healthcare provider

## 2025-02-11 NOTE — PROGRESS NOTES
Assessment/Plan:    Ultrasound normal. Discussed gallbladder issues sometimes require HIDA scan to diagnose. Differential diagnosis also includes GERD/gastritis/PUD. Offered GI cocktail in clinic, patient declines.   Continue famotidine, start Prilosec as well. H pylori test ordered.   Lifestyle changes discussed (see after visit summary). See [facility] if not improving. ER if worsening.     See patient instructions below.    At the end of the encounter, I discussed results, diagnosis, medications. Discussed red flags for immediate return to clinic/ER, as well as indications for follow up if no improvement. Patient understood and agreed to plan. Patient was stable for discharge.    43 minutes was spent preparing for the visit and reviewing the patient's chart; getting a history and performing an exam; counseling and providing education to the patient, family, or caregiver; ordering medicines and/or tests; communicating with other healthcare professionals; documenting information in the medical record; interpreting results and sharing that information with the patient, family, or caregiver; and care coordination.       ICD-10-CM    1. RUQ abdominal pain  R10.11 US Abdomen Limited     Helicobacter pylori Antigen Stool     Helicobacter pylori Antigen Stool      2. Gastroesophageal reflux disease without esophagitis  K21.9 Helicobacter pylori Antigen Stool     omeprazole (PRILOSEC) 40 MG DR capsule     Helicobacter pylori Antigen Stool            Return in about 2 weeks (around 2/25/2025) for follow up with [facility] if not improving.    MEENAKSHI Borges, PRIETO  [facility]  -----------------------------------------------------------------------------------------------------------------------------------------------------    HPI:  Eli Pineda is a 39 year old female with history of GERD who presents for evaluation of the following:    Abdominal/Flank Pain  Onset/Duration: 3 days  Description:    Character: Dull ache  Location: right upper quadrant  Radiation: right flank  Intensity: 3/10, when wave of sharp pain comes, 8/10  Progression of Symptoms:  intermittent  Accompanying Signs & Symptoms:  Fever/chills: YES- Chills but no fever  Gas/Bloating: YES- Gas and a little bloating  Nausea: YES  Vomitting: no   Diarrhea: no   Constipation:no   Dysuria: no            Hematuria: no           Frequency: no            Incontinence of urine: no   History:            Last bowel movement: yesterday   Trauma: no   Previous similar pain: no    Previous tests done: UA, CBC, CMP, lipase yesterday in urgent care which were normal           Previous Abdominal surgery: YES- Ovarian cyst removed  Precipitating factors:   Does the pain change with:     Food: no    Bowel Movement: no     Urination: no              Other factors: no   Therapies tried and outcome:  Tylenol - Patient was able to sleep, helped a little.     When food last eaten: Last night around 6:45 pm.    Pain lasts for about 3 seconds at a time and is happening about every 4 minutes, feels like a contraction.   Denies hematochezia or melena.  She had upper endoscopy & colonoscopy in 2023 which showed diverticulosis and internal hemorrhoids.  She does have a history of GERD, takes famotidine twice daily. Sees [facility]. Heartburn has been more frequent in the last few weeks. She eats a lot of salads. EtOH and spicy foods rarely.     No past medical history on file.    Vitals:    02/11/25 1030   BP: 115/78   BP Location: Right arm   Patient Position: Sitting   Cuff Size: Adult Regular   Pulse: 70   Resp: 16   Temp: 98  F (36.7  C)   TempSrc: Oral   SpO2: 100%   Weight: 67.9 kg (149 lb 11.2 oz)       Physical Exam  Vitals and nursing note reviewed.   Pulmonary:      Effort: Pulmonary effort is normal.   Abdominal:      General: Bowel sounds are normal. There is no distension.      Palpations: Abdomen is soft.      Tenderness: There is abdominal tenderness in the  right upper quadrant. There is no right CVA tenderness or left CVA tenderness. Positive signs include Mccartney's sign.   Neurological:      Mental Status: She is alert.         Labs/Imaging:  Results for orders placed or performed in visit on 02/10/25 (from the past 24 hours)   UA Macroscopic with reflex to Microscopic and Culture - Clinic Collect    Specimen: Urine, Clean Catch   Result Value Ref Range    Color Urine Yellow Colorless, Straw, Light Yellow, Yellow    Appearance Urine Clear Clear    Glucose Urine Negative Negative mg/dL    Bilirubin Urine Negative Negative    Ketones Urine 15 (A) Negative mg/dL    Specific Gravity Urine 1.020 1.005 - 1.030    Blood Urine Trace (A) Negative    pH Urine 5.5 5.0 - 8.0    Protein Albumin Urine Negative Negative mg/dL    Urobilinogen Urine 0.2 0.2, 1.0 E.U./dL    Nitrite Urine Negative Negative    Leukocyte Esterase Urine Negative Negative   UA Microscopic with Reflex to Culture   Result Value Ref Range    Bacteria Urine Few (A) None Seen /HPF    RBC Urine 5-10 (A) 0-2 /HPF /HPF    WBC Urine 0-5 0-5 /HPF /HPF    Squamous Epithelials Urine Few (A) None Seen /LPF    WBC Clumps Urine None Seen None Seen /HPF    Narrative    Urine Culture not indicated   Comprehensive metabolic panel (BMP + Alb, Alk Phos, ALT, AST, Total. Bili, TP)   Result Value Ref Range    Sodium 142 135 - 145 mmol/L    Potassium 4.2 3.4 - 5.3 mmol/L    Chloride 110 (H) 94 - 109 mmol/L    Carbon Dioxide (CO2) 27 20 - 32 mmol/L    Anion Gap 5 3 - 14 mmol/L    Urea Nitrogen 9 7 - 30 mg/dL    Creatinine 0.70 0.52 - 1.04 mg/dL    GFR Estimate >90 >60 mL/min/1.73m2    Calcium 9.6 8.5 - 10.1 mg/dL    Glucose 92 70 - 99 mg/dL    Alkaline Phosphatase 39 (L) 40 - 150 U/L    AST 18 0 - 45 U/L    ALT 10 0 - 50 U/L    Protein Total 6.6 (L) 6.8 - 8.8 g/dL    Albumin 4.0 3.4 - 5.0 g/dL    Bilirubin Total 0.8 0.2 - 1.3 mg/dL   Lipase   Result Value Ref Range    Lipase 33 13 - 60 U/L   CBC with platelets and differential     Narrative    The following orders were created for panel order CBC with platelets and differential.  Procedure                               Abnormality         Status                     ---------                               -----------         ------                     CBC with platelets and d...[[id number]]                      Final result                 Please view results for these tests on the individual orders.   CBC with platelets and differential   Result Value Ref Range    WBC Count 7.2 4.0 - 11.0 10e3/uL    RBC Count 4.21 3.80 - 5.20 10e6/uL    Hemoglobin 13.6 11.7 - 15.7 g/dL    Hematocrit 40.1 35.0 - 47.0 %    MCV 95 78 - 100 fL    MCH 32.3 26.5 - 33.0 pg    MCHC 33.9 31.5 - 36.5 g/dL    RDW 12.4 10.0 - 15.0 %    Platelet Count 232 150 - 450 10e3/uL    % Neutrophils 44 %    % Lymphocytes 48 %    % Monocytes 8 %    % Eosinophils 1 %    % Basophils 0 %    % Immature Granulocytes 0 %    Absolute Neutrophils 3.2 1.6 - 8.3 10e3/uL    Absolute Lymphocytes 3.5 0.8 - 5.3 10e3/uL    Absolute Monocytes 0.6 0.0 - 1.3 10e3/uL    Absolute Eosinophils 0.1 0.0 - 0.7 10e3/uL    Absolute Basophils 0.0 0.0 - 0.2 10e3/uL    Absolute Immature Granulocytes 0.0 <=0.4 10e3/uL     No results found for this or any previous visit (from the past 24 hours).    Results for orders placed or performed during the hospital encounter of 02/11/25   US Abdomen Limited     Status: None    Narrative    EXAM: US ABDOMEN LIMITED  LOCATION: [facility]  DATE: 2/11/2025    INDICATION: RUQ pain intermittent x 3 days  COMPARISON: None.  TECHNIQUE: Limited abdominal ultrasound.    FINDINGS:    GALLBLADDER: Normal. No gallstones, wall thickening, or pericholecystic fluid. Negative sonographic Mccartney's sign.    BILE DUCTS: No biliary dilatation. The common duct measures 4 mm.    LIVER: Normal parenchyma with smooth contour. No focal mass. The portal vein is patent with flow in the normal direction.    RIGHT KIDNEY: No  hydronephrosis.    PANCREAS: The visualized portions are normal.    No ascites.      Impression    IMPRESSION:  1.  Normal limited abdominal ultrasound.           Patient Instructions   Continue famotidine twice daily.   Submit stool sample.   Start omeprazole daily.   Avoid raw vegetables/salads, caffeine, alcohol, spicy foods, NSAIDs (ibuprofen, Advil, naproxen).   Follow up with [facility] if not improving; go to ER if worsening.    Gastritis    Gastritis is inflammation and irritation of the stomach lining. You can have it for a short time (acute) or be long lasting (chronic). Infection with bacteria called H pylori most often causes gastritis. More than a third of people in the [place] have these bacteria in their bodies. In many cases, H pylori causes no problems or symptoms. In some people, though, the infection irritates the stomach lining and causes gastritis. H. pylori may be diagnosed through blood, stool, or breath tests, we well as through biopsy during an endoscopy. Other causes of stomach irritation include drinking alcohol, smoking or chewing tobacco, or taking pain-relieving medicines called NSAIDs (such as aspirin or ibuprofen). Certain drugs (such as cocaine) and immune conditions can also cause gastritis.  Symptoms of gastritis can include:  Belly pain or bloating  Feeling full quickly  Loss of appetite  Nausea or vomiting  Vomiting blood or having black stools  Feeling more tired than usual  An inflamed and irritated stomach lining is more likely to develop a sore called an ulcer. To help prevent this, gastritis should be treated.  Home care  Lifestyle changes can help reduce symptoms. If needed, your healthcare provider may prescribe medicines. Symptoms often improve with treatment, but if treatment is stopped, the symptoms often return after a few months. So most persons with GERD will need to continue treatment or get treatment on and off.  Lifestyle changes  Drinking six to eight glasses of water  "daily and eating high-fiber, low-fat foods (for example, fruits, vegetables, and whole-grain breads and cereals) are recommended. Drinking carbonated beverages, alcohol, and caffeinated beverages; eating high-fat and spicy foods; and smoking are discouraged. Some foods, such as beans, cabbage, and cauliflower, naturally produce gas and should be limited or avoided. Meals that are small, regular, and frequent are encouraged because they are easier to digest than large ones.   Don t eat large meals, especially at night. Frequent, smaller meals are best. Don't lie down right after eating. And don t eat anything 3 hours before going to bed.  Don't drink alcohol or smoke. As much as possible, stay away from second hand smoke.  If you are overweight, losing weight will reduce symptoms.   Don't wear tight clothing around your stomach area.  If your symptoms occur during sleep, use a foam wedge to elevate your upper body (not just your head.) Or, place 4\" blocks under the head of your bed. Or use 2 bed risers under your bedframe.  Medicines  If needed, medicines can help relieve the symptoms of GERD and prevent damage to the esophagus. Discuss a medicine plan with your healthcare provider. This may include one or more of the following medicines:  Antacids to help neutralize the normal acids in your stomach.  Acid blockers (Histamine or H2 blockers) to decrease acid production.  Acid inhibitors (proton pump inhibitors PPIs) to decrease acid production in a different way than the blockers. They may work better, but can take a little longer to take effect.  Take an antacid 30 to 60 minutes after eating and at bedtime, but not at the same time as an acid blocker.  Try not to take medicines such as ibuprofen and aspirin. If you are taking aspirin for your heart or other medical reasons, talk to your healthcare provider about stopping it.If needed, our healthcare provider may prescribe medicines. If you have H pylori infection, " treating it will likely relieve your symptoms. Other changes can help reduce stomach irritation and help it heal.  If you have been prescribed medicines for H pylori infection, take them as directed. Take all of the medicine until it is finished or your healthcare provider tells you to stop, even if you feel better.  Follow-up care  Follow up with your healthcare provider, or as advised by our staff. You may need testing to check for inflammation or an ulcer.  When to seek medical advice  Call your healthcare provider for any of the following:  Stomach pain that gets worse or moves to the lower right belly (appendix area)  Chest pain that appears or gets worse, or spreads to the back, neck, shoulder, or arm  Frequent vomiting (can t keep down liquids)  Blood in the stool or vomit (red or black in color)  Feeling weak or dizzy  Shortness of breath  Unexplained weight loss  Fever of 100.4 F (38 C) or higher, or as directed by your healthcare provider

## 2025-03-27 ENCOUNTER — LAB (OUTPATIENT)
Dept: LAB | Facility: CLINIC | Age: 40
End: 2025-03-27
Payer: COMMERCIAL

## 2025-03-27 DIAGNOSIS — E03.9 HYPOTHYROIDISM, UNSPECIFIED TYPE: ICD-10-CM

## 2025-03-27 LAB
CREAT SERPL-MCNC: 0.68 MG/DL (ref 0.51–0.95)
EGFRCR SERPLBLD CKD-EPI 2021: >90 ML/MIN/1.73M2
T4 FREE SERPL-MCNC: 1.95 NG/DL (ref 0.9–1.7)
TSH SERPL DL<=0.005 MIU/L-ACNC: 0.08 UIU/ML (ref 0.3–4.2)
VIT D+METAB SERPL-MCNC: 14 NG/ML (ref 20–50)

## 2025-05-15 ENCOUNTER — OFFICE VISIT (OUTPATIENT)
Dept: ENDOCRINOLOGY | Facility: CLINIC | Age: 40
End: 2025-05-15
Payer: COMMERCIAL

## 2025-05-15 VITALS
HEART RATE: 73 BPM | SYSTOLIC BLOOD PRESSURE: 116 MMHG | WEIGHT: 150 LBS | DIASTOLIC BLOOD PRESSURE: 79 MMHG | BODY MASS INDEX: 23.85 KG/M2 | OXYGEN SATURATION: 99 %

## 2025-05-15 DIAGNOSIS — E03.9 HYPOTHYROIDISM, UNSPECIFIED TYPE: Primary | ICD-10-CM

## 2025-05-15 DIAGNOSIS — E55.9 VITAMIN D DEFICIENCY: ICD-10-CM

## 2025-05-15 RX ORDER — LEVOTHYROXINE SODIUM 125 UG/1
125 TABLET ORAL DAILY
Qty: 90 TABLET | Refills: 3 | Status: SHIPPED | OUTPATIENT
Start: 2025-05-15

## 2025-05-15 NOTE — LETTER
"5/15/2025      Eli Pineda  [address]  [facility] [address]      Dear Colleague,    Thank you for referring your patient, Eli Pineda, to the [facility] [place]. Please see a copy of my visit note below.    [facility] ENDOCRINOLOGY    Thyroid Note  5/15/2025    Eli Pienda, 1985, [id number]          Reason for visit      1. Hypothyroidism, unspecified type    2. Vitamin D deficiency        HPI     Eli Pineda is a very pleasant 40 year old old female who presents for follow up.  SUMMARY:    Eli returns today in follow-up for Hypothyroidism and Vit D deficiency.    Her current TSH is 0.08 and fT4 is 1.95. Former is a little low, and latter is a little high. She is comfortable with her current dosing, but would like to recheck her labs in 2 months, and see if they are a little more even. She is currently taking 125 mcg of Levothyroxine daily. She is having no problems referable to her neck.     She is endorsing some fatigue, and likely not associated with her Thyroid, given the current levels. She reports that she \"just started to make a better effort at taking the supplement every day\". Current level is 14 (range 20-50).         Past Medical History     Patient Active Problem List   Diagnosis     Colitis     Elevated C-reactive Protein     Headache     Hypothyroidism     Anxiety     Attention deficit hyperactivity disorder (ADHD), combined type     Mild episode of recurrent major depressive disorder     Asthma     Gastroesophageal reflux disease     Mixed stress and urge urinary incontinence     Uterovaginal prolapse     Obesity (BMI 35.0-39.9) with comorbidity (H)     Diarrhea     Noninfectious gastroenteritis     Nonspecific abdominal pain     Vitamin D deficiency     Abdominal bloating     Abdominal distension, gaseous     Disorder relating to short gestation and low birthweight     Diverticular disease of large intestine     Dysphagia     " Epigastric pain     Heartburn     Hemorrhoids     Irregular bowel habits       Family History       family history includes Thyroid Disease in her mother.    Social History      reports that she has never smoked. She has never been exposed to tobacco smoke. She has never used smokeless tobacco. She reports current alcohol use of about 4.0 standard drinks of alcohol per week. She reports that she does not use drugs.      Review of Systems     Patient denies fatigue, weight changes, heat/cold intolerance, bowel/skin changes or CVS symptoms.   Remainder per HPI and per attached intake form.      Vital Signs     /79 (BP Location: Left arm, Patient Position: Sitting, Cuff Size: Adult Regular)   Pulse 73   Wt 68 kg (150 lb)   SpO2 99%   BMI 23.85 kg/m    Wt Readings from Last 3 Encounters:   05/15/25 68 kg (150 lb)   02/11/25 67.9 kg (149 lb 11.2 oz)   02/10/25 69.1 kg (152 lb 4.8 oz)       Physical Exam     General:  Normal, NIRD,appears euthyroid  Eyes:  Pupils equal, round and reactive to light; no proptosis, lid lag or  periorbital edema.  Thyroid:  Thyroid is normal.  No tenderness or bruit  Neck: No lymph nodes  Musculoskeletal:  Muscle strength grossly normal without evidence of wasting.  Heart:  Regular rate and rhythm without murmur.  Lungs:  Clear to auscultation.  Abdomen: Soft, non-tender, no masses or organomegaly  Neuro: Patella Reflexes were normal.No tremors  Skin:  No acanthosis nigricans or vitiligo      Assessment     1. Hypothyroidism, unspecified type    2. Vitamin D deficiency            Plan     Will recheck her Thyroid labs in 2 months. She will remain on the Levothyroxine 125 mcg daily.     She will continue with her supplement (unsure what the dosage is) and we will recheck that in 2 months as well.     Follow-up with me in 1 year.         Cuca Duff NP   Endocrinology  5/15/2025  4:10 PM    Lab Results     TSH   Date Value Ref Range Status   03/27/2025 0.08 (L) 0.30 - 4.20 uIU/mL  "Final   05/05/2022 0.60 0.30 - 5.00 uIU/mL Final     No components found for: \"THYROIDAB\"    No results found for: \"Y7AYVGC\"    Imaging Results   Last thyroid ultrasound:  No results found for this or any previous visit.      Last thyroid nuclear scan:  No results found for this or any previous visit.      Current Medications     Outpatient Medications Prior to Visit   Medication Sig Dispense Refill     levonorgestrel (MIRENA) 52 MG (20 mcg/day) IUD by Intrauterine route once.       albuterol (PROAIR HFA/PROVENTIL HFA/VENTOLIN HFA) 108 (90 Base) MCG/ACT inhaler Inhale 2 puffs into the lungs every 4 hours as needed for shortness of breath, wheezing or cough. (Patient not taking: Reported on 5/15/2025) 18 g 0     omeprazole (PRILOSEC) 40 MG DR capsule Take 1 capsule (40 mg) by mouth daily. Take 30-60 minutes before a meal. (Patient not taking: Reported on 5/15/2025) 30 capsule 1     levothyroxine (SYNTHROID/LEVOTHROID) 125 MCG tablet Take 1 tablet (125 mcg) by mouth daily. 90 tablet 2     No facility-administered medications prior to visit.         Again, thank you for allowing me to participate in the care of your patient.        Sincerely,        Cuca Duff NP    Electronically signed"

## 2025-05-15 NOTE — PROGRESS NOTES
"[facility] ENDOCRINOLOGY    Thyroid Note  5/15/2025    Eli Pineda, 1985, [id number]          Reason for visit      1. Hypothyroidism, unspecified type    2. Vitamin D deficiency        HPI     Eli Pineda is a very pleasant 40 year old old female who presents for follow up.  SUMMARY:    Eli returns today in follow-up for Hypothyroidism and Vit D deficiency.    Her current TSH is 0.08 and fT4 is 1.95. Former is a little low, and latter is a little high. She is comfortable with her current dosing, but would like to recheck her labs in 2 months, and see if they are a little more even. She is currently taking 125 mcg of Levothyroxine daily. She is having no problems referable to her neck.     She is endorsing some fatigue, and likely not associated with her Thyroid, given the current levels. She reports that she \"just started to make a better effort at taking the supplement every day\". Current level is 14 (range 20-50).         Past Medical History     Patient Active Problem List   Diagnosis    Colitis    Elevated C-reactive Protein    Headache    Hypothyroidism    Anxiety    Attention deficit hyperactivity disorder (ADHD), combined type    Mild episode of recurrent major depressive disorder    Asthma    Gastroesophageal reflux disease    Mixed stress and urge urinary incontinence    Uterovaginal prolapse    Obesity (BMI 35.0-39.9) with comorbidity (H)    Diarrhea    Noninfectious gastroenteritis    Nonspecific abdominal pain    Vitamin D deficiency    Abdominal bloating    Abdominal distension, gaseous    Disorder relating to short gestation and low birthweight    Diverticular disease of large intestine    Dysphagia    Epigastric pain    Heartburn    Hemorrhoids    Irregular bowel habits       Family History       family history includes Thyroid Disease in her mother.    Social History      reports that she has never smoked. She has never been exposed to tobacco smoke. She has never used " "smokeless tobacco. She reports current alcohol use of about 4.0 standard drinks of alcohol per week. She reports that she does not use drugs.      Review of Systems     Patient denies fatigue, weight changes, heat/cold intolerance, bowel/skin changes or CVS symptoms.   Remainder per HPI and per attached intake form.      Vital Signs     /79 (BP Location: Left arm, Patient Position: Sitting, Cuff Size: Adult Regular)   Pulse 73   Wt 68 kg (150 lb)   SpO2 99%   BMI 23.85 kg/m    Wt Readings from Last 3 Encounters:   05/15/25 68 kg (150 lb)   02/11/25 67.9 kg (149 lb 11.2 oz)   02/10/25 69.1 kg (152 lb 4.8 oz)       Physical Exam     General:  Normal, NIRD,appears euthyroid  Eyes:  Pupils equal, round and reactive to light; no proptosis, lid lag or  periorbital edema.  Thyroid:  Thyroid is normal.  No tenderness or bruit  Neck: No lymph nodes  Musculoskeletal:  Muscle strength grossly normal without evidence of wasting.  Heart:  Regular rate and rhythm without murmur.  Lungs:  Clear to auscultation.  Abdomen: Soft, non-tender, no masses or organomegaly  Neuro: Patella Reflexes were normal.No tremors  Skin:  No acanthosis nigricans or vitiligo      Assessment     1. Hypothyroidism, unspecified type    2. Vitamin D deficiency            Plan     Will recheck her Thyroid labs in 2 months. She will remain on the Levothyroxine 125 mcg daily.     She will continue with her supplement (unsure what the dosage is) and we will recheck that in 2 months as well.     Follow-up with me in 1 year.         Cuca Duff NP   Endocrinology  5/15/2025  4:10 PM    Lab Results     TSH   Date Value Ref Range Status   03/27/2025 0.08 (L) 0.30 - 4.20 uIU/mL Final   05/05/2022 0.60 0.30 - 5.00 uIU/mL Final     No components found for: \"THYROIDAB\"    No results found for: \"L1DCRDA\"    Imaging Results   Last thyroid ultrasound:  No results found for this or any previous visit.      Last thyroid nuclear scan:  No results found for " this or any previous visit.      Current Medications     Outpatient Medications Prior to Visit   Medication Sig Dispense Refill    levonorgestrel (MIRENA) 52 MG (20 mcg/day) IUD by Intrauterine route once.      albuterol (PROAIR HFA/PROVENTIL HFA/VENTOLIN HFA) 108 (90 Base) MCG/ACT inhaler Inhale 2 puffs into the lungs every 4 hours as needed for shortness of breath, wheezing or cough. (Patient not taking: Reported on 5/15/2025) 18 g 0    omeprazole (PRILOSEC) 40 MG DR capsule Take 1 capsule (40 mg) by mouth daily. Take 30-60 minutes before a meal. (Patient not taking: Reported on 5/15/2025) 30 capsule 1    levothyroxine (SYNTHROID/LEVOTHROID) 125 MCG tablet Take 1 tablet (125 mcg) by mouth daily. 90 tablet 2     No facility-administered medications prior to visit.

## 2025-05-28 ASSESSMENT — ASTHMA QUESTIONNAIRES
QUESTION_4 LAST FOUR WEEKS HOW OFTEN HAVE YOU USED YOUR RESCUE INHALER OR NEBULIZER MEDICATION (SUCH AS ALBUTEROL): NOT AT ALL
QUESTION_1 LAST FOUR WEEKS HOW MUCH OF THE TIME DID YOUR ASTHMA KEEP YOU FROM GETTING AS MUCH DONE AT WORK, SCHOOL OR AT HOME: NONE OF THE TIME
QUESTION_2 LAST FOUR WEEKS HOW OFTEN HAVE YOU HAD SHORTNESS OF BREATH: NOT AT ALL
QUESTION_5 LAST FOUR WEEKS HOW WOULD YOU RATE YOUR ASTHMA CONTROL: COMPLETELY CONTROLLED
ACT_TOTALSCORE: 25
QUESTION_3 LAST FOUR WEEKS HOW OFTEN DID YOUR ASTHMA SYMPTOMS (WHEEZING, COUGHING, SHORTNESS OF BREATH, CHEST TIGHTNESS OR PAIN) WAKE YOU UP AT NIGHT OR EARLIER THAN USUAL IN THE MORNING: NOT AT ALL

## 2025-05-28 ASSESSMENT — PATIENT HEALTH QUESTIONNAIRE - PHQ9
SUM OF ALL RESPONSES TO PHQ QUESTIONS 1-9: 1
10. IF YOU CHECKED OFF ANY PROBLEMS, HOW DIFFICULT HAVE THESE PROBLEMS MADE IT FOR YOU TO DO YOUR WORK, TAKE CARE OF THINGS AT HOME, OR GET ALONG WITH OTHER PEOPLE: NOT DIFFICULT AT ALL
SUM OF ALL RESPONSES TO PHQ QUESTIONS 1-9: 1

## 2025-05-29 ENCOUNTER — TELEPHONE (OUTPATIENT)
Dept: FAMILY MEDICINE | Facility: CLINIC | Age: 40
End: 2025-05-29

## 2025-05-29 ENCOUNTER — OFFICE VISIT (OUTPATIENT)
Dept: FAMILY MEDICINE | Facility: CLINIC | Age: 40
End: 2025-05-29
Payer: COMMERCIAL

## 2025-05-29 VITALS
WEIGHT: 150 LBS | SYSTOLIC BLOOD PRESSURE: 110 MMHG | HEART RATE: 73 BPM | HEIGHT: 66 IN | RESPIRATION RATE: 18 BRPM | TEMPERATURE: 97 F | BODY MASS INDEX: 24.11 KG/M2 | OXYGEN SATURATION: 97 % | DIASTOLIC BLOOD PRESSURE: 70 MMHG

## 2025-05-29 DIAGNOSIS — E66.811 CLASS 1 OBESITY WITHOUT SERIOUS COMORBIDITY IN ADULT, UNSPECIFIED BMI, UNSPECIFIED OBESITY TYPE: Primary | ICD-10-CM

## 2025-05-29 ASSESSMENT — ANXIETY QUESTIONNAIRES
1. FEELING NERVOUS, ANXIOUS, OR ON EDGE: NOT AT ALL
5. BEING SO RESTLESS THAT IT IS HARD TO SIT STILL: NOT AT ALL
IF YOU CHECKED OFF ANY PROBLEMS ON THIS QUESTIONNAIRE, HOW DIFFICULT HAVE THESE PROBLEMS MADE IT FOR YOU TO DO YOUR WORK, TAKE CARE OF THINGS AT HOME, OR GET ALONG WITH OTHER PEOPLE: NOT DIFFICULT AT ALL
4. TROUBLE RELAXING: NOT AT ALL
GAD7 TOTAL SCORE: 0
8. IF YOU CHECKED OFF ANY PROBLEMS, HOW DIFFICULT HAVE THESE MADE IT FOR YOU TO DO YOUR WORK, TAKE CARE OF THINGS AT HOME, OR GET ALONG WITH OTHER PEOPLE?: NOT DIFFICULT AT ALL
6. BECOMING EASILY ANNOYED OR IRRITABLE: NOT AT ALL
2. NOT BEING ABLE TO STOP OR CONTROL WORRYING: NOT AT ALL
7. FEELING AFRAID AS IF SOMETHING AWFUL MIGHT HAPPEN: NOT AT ALL
7. FEELING AFRAID AS IF SOMETHING AWFUL MIGHT HAPPEN: NOT AT ALL
GAD7 TOTAL SCORE: 0
3. WORRYING TOO MUCH ABOUT DIFFERENT THINGS: NOT AT ALL
GAD7 TOTAL SCORE: 0

## 2025-05-29 ASSESSMENT — PAIN SCALES - GENERAL: PAINLEVEL_OUTOF10: MODERATE PAIN (5)

## 2025-05-29 NOTE — TELEPHONE ENCOUNTER
Prior Authorization Retail Medication Request    Medication/Dose: tirzepatide (MOUNJARO) 5mg  Diagnosis and ICD code (if different than what is on RX):  Class 1 obesity without serious comorbidity in adult, unspecified BMI, unspecified obesity type [E66.811]   New/renewal/insurance change PA/secondary ins. PA: New    Clinic Information  Preferred routing pool for dept communication: Nancy Owen

## 2025-05-29 NOTE — PROGRESS NOTES
Assessment & Plan     Class 1 obesity without serious comorbidity in adult, unspecified BMI, unspecified obesity type  Patient has had successful weight loss with being on GLP-1 injections.  She is really hoping to lose about 10 more pounds.  Given change in compounding pharmacy ability to prescribe semaglutide, she needs to go back on Mounjaro.  Reviewed dosage titration and switching medication with patient.  She has only missed 1 dose of semaglutide 2.5 mg, we will start her on Mounjaro 5 mg weekly injections.  - tirzepatide (MOUNJARO) 5 MG/0.5ML SOAJ auto-injector pen; Inject 0.5 mLs (5 mg) subcutaneously once a week.            Mine Hoang is a 40 year old, presenting for the following health issues:  Recheck Medication (GLP-1; wants Zepbound as was on previously, and stopped Semaglutide last week. Only covers Mounjaro)      5/29/2025    11:03 AM   Additional Questions   Roomed by nl     History of Present Illness       Reason for visit:  Weight loss medication titration- this Medication actually helped multiple other body symptoms  Symptom onset:  More than a month  Symptom intensity:  Mild  Symptom progression:  Staying the same  Had these symptoms before:  No  What makes it better:  Zepbound   She is taking medications regularly.        Here to discuss weight loss medications  She has been on injections through weight watchers clinic online.  She has had about 70 pounds of weight loss being on injections.  She was previously on Zepbound, then insurance change after the new year and she was switched to compounded semaglutide.  This is being discontinued, so she is interested in trying to get back on Zepbound.  She wants to lose about 10 more pounds.  She reports the medication has helped significantly with numerous chronic conditions including IBS and thyroid issues.      She has currently been on semaglutide, last injection was a week ago 21st. Has been on 2.5mg.  Tolerating medication without side  effects    She checked with insurance and they stated that they should cover Mounjaro.          Review of Systems  Detailed as above      Objective    There were no vitals taken for this visit.  There is no height or weight on file to calculate BMI.  Physical Exam   GENERAL: alert and no distress  MS: no gross musculoskeletal defects noted, no edema            Signed Electronically by: GOSIA Venegas CNP

## 2025-05-31 NOTE — TELEPHONE ENCOUNTER
[facility] Pharm[facility] Prior Authorization Team   Phone: [phone]    PA Initiation    Medication: MOUNJARO 5 MG/0.5ML SC SOAJ  Insurance Company: [employer] Ph: [phone]  Pharmacy Filling the Rx: [id number] [place] [address] [address] [place] [address]  Filling Pharmacy Phone: [phone]  Filling Pharmacy Fax:    Start Date: 5/31/2025    KENN HDEZ (Curiel: [id number])
